# Patient Record
Sex: FEMALE | Employment: FULL TIME | URBAN - METROPOLITAN AREA
[De-identification: names, ages, dates, MRNs, and addresses within clinical notes are randomized per-mention and may not be internally consistent; named-entity substitution may affect disease eponyms.]

---

## 2022-12-03 NOTE — PROGRESS NOTES
[address] [place] [address] 148[facility] [address]  Dept: [phone]    Patient Name: Jayla Parks  Patient Age: 23 y. o. Date of Visit: 2022    SUBJECTIVE:    Chief Complaint:  Missed menses      HPI:    Sonu Hallman  is being seen today for missed menses. She declines concerns and reports she is feeling well. She reports a positive pregnancy test on 22. This  is a planned pregnancy. She is accepting at this time. LMP: Patient's last menstrual period was 10/30/2022 (exact date). Sure and definite: Yes    28 day cycle: Yes    She was not on a contraceptive at the time of conception. Estimated weeks gestation today : 5w4d  Tentative EVITA: 23    Relationship with FOB: Abiel     Patient reports concerns: no    OB History          0    Para   0    Term   0       0    AB   0    Living   0         SAB   0    IAB   0    Ectopic   0    Molar   0    Multiple   0    Live Births   0              History reviewed. No pertinent past medical history. Current Outpatient Medications   Medication Sig Dispense Refill    Prenat w/o P-QI-Bxxbnkp-FA-DHA (PNV-DHA PO) Take by mouth      Misc. Devices KIT Nature's Blend Prenatal Multivitamin with Minerals [facility] Baby Box)  Ul[address]: [address]-41; Take 1 softgel by mouth daily or as instructed by provider;  #qs for 6 months 1 kit 1     No current facility-administered medications for this visit.          OBJECTIVE:    /60 (Position: Sitting)   Ht 5' 4\" (1.626 m)   Wt 123 lb 3.2 oz (55.9 kg)   LMP 10/30/2022 (Exact Date)   BMI 21.15 kg/m²     Mother's ethnicity:  cauc[place]   Father's ethnicity:  c[place]     She is complaining today of:   Pain: No  Cramping: No  Bleeding or spotting: No    Nausea: No  Vomiting: No    Breast enlargement/tenderness: No  Fatigue: No  Frequency of urination: No    Previous high risk obstetrical history: NA  OB History    Para Term  AB Living   0 0 0 0 0 0   SAB IAB Ectopic Molar Multiple Live Births   0 0 0 0 0 0       History was reviewed as documented on Epic Navigator. ASSESSMENT/PLAN:  Missed Menses  - UCG was done and noted as positive  - Patient is taking prenatal vitamins - Yes  - Ultrasound for dating and viability was ordered: Yes  - OB form was given: No  - Initial information on genetic testing was given:Yes   Hailey Hurley offered and unsure of genetic screening at this time. Reviewed routine tests done at new OB visit and patient verbally consents to urine drug screen and HIV screening at New OB Visit  - 1 hour glucola needed at OB history: No  - She was instructed to call with any concerns or worsening of any symptoms  - She will return for New OB intake visit  - Education given and reviewed with Hailey Hurley today include How Your Fetus Grows During Pregnancy ACOG FAQ, Routine Tests During Pregnancy ACOG FAQ, Carrier Screening ACOG FAQ, Prenatal Genetic Screening Tests ACOG FAQ, Prenatal Genetic Testing Chart, and Resources for your Pregnancy, birth, labor and beyond  - POCT urine pregnancy  - US OB LESS THAN 14 WEEKS SINGLE OR FIRST GESTATION; Future  - US OB TRANSVAGINAL; Future      Patient was seen with total face to face time of 30 minutes. More than 50% of this visit was spent face to face coordinating plan of care and answering questions regarding      Return in about 2 weeks (around 12/22/2022) for dating and viability US and IPV.     Electronically signed by: PILAR Torres CNM

## 2022-12-07 NOTE — TELEPHONE ENCOUNTER
Dr. Yelena Auguste, patient interested in free prenatal vitamins and iron per response from [address] [facility]. Order for [facility] Baby Box pending for your convenience. Thank you,  Vaibhav Mendieta, PharmSTEFANIE  [address]  Department, toll free: [phone]  [address]      =======================================================================    [place] REVIEW - Be Well With Baby    JIMBO Cope is a 23 y.o. female currently identified as interested in receiving a [facility] \"Baby Box\" containing a 1 year supply of prenatal vitamins from [facility]. Contents of Baby Box:  Welcome Letter  6 month supply of Nature's Blend Prenatal Multivitamin with Minerals (Take 1 softgel once daily) with 1 refill    Thank you  Michelle Mendieta PharmD  [address]  Department, toll free: [phone]  [address]

## 2022-12-08 ENCOUNTER — OFFICE VISIT (OUTPATIENT)
Dept: OBGYN CLINIC | Age: 22
End: 2022-12-08

## 2022-12-08 VITALS
HEIGHT: 64 IN | WEIGHT: 123.2 LBS | SYSTOLIC BLOOD PRESSURE: 102 MMHG | BODY MASS INDEX: 21.03 KG/M2 | DIASTOLIC BLOOD PRESSURE: 60 MMHG

## 2022-12-08 DIAGNOSIS — N92.6 MISSED MENSES: Primary | ICD-10-CM

## 2022-12-08 LAB
CONTROL: PRESENT
PREGNANCY TEST URINE, POC: POSITIVE

## 2022-12-08 SDOH — ECONOMIC STABILITY: FOOD INSECURITY: WITHIN THE PAST 12 MONTHS, THE FOOD YOU BOUGHT JUST DIDN'T LAST AND YOU DIDN'T HAVE MONEY TO GET MORE.: NEVER TRUE

## 2022-12-08 SDOH — ECONOMIC STABILITY: HOUSING INSECURITY: IN THE LAST 12 MONTHS, HOW MANY PLACES HAVE YOU LIVED?: 1

## 2022-12-08 SDOH — ECONOMIC STABILITY: INCOME INSECURITY: IN THE LAST 12 MONTHS, WAS THERE A TIME WHEN YOU WERE NOT ABLE TO PAY THE MORTGAGE OR RENT ON TIME?: NO

## 2022-12-08 SDOH — ECONOMIC STABILITY: FOOD INSECURITY: WITHIN THE PAST 12 MONTHS, YOU WORRIED THAT YOUR FOOD WOULD RUN OUT BEFORE YOU GOT MONEY TO BUY MORE.: NEVER TRUE

## 2022-12-08 SDOH — ECONOMIC STABILITY: TRANSPORTATION INSECURITY
IN THE PAST 12 MONTHS, HAS LACK OF TRANSPORTATION KEPT YOU FROM MEETINGS, WORK, OR FROM GETTING THINGS NEEDED FOR DAILY LIVING?: NO

## 2022-12-08 SDOH — ECONOMIC STABILITY: TRANSPORTATION INSECURITY
IN THE PAST 12 MONTHS, HAS THE LACK OF TRANSPORTATION KEPT YOU FROM MEDICAL APPOINTMENTS OR FROM GETTING MEDICATIONS?: NO

## 2022-12-08 SDOH — ECONOMIC STABILITY: HOUSING INSECURITY
IN THE LAST 12 MONTHS, WAS THERE A TIME WHEN YOU DID NOT HAVE A STEADY PLACE TO SLEEP OR SLEPT IN A SHELTER (INCLUDING NOW)?: NO

## 2022-12-08 ASSESSMENT — PATIENT HEALTH QUESTIONNAIRE - PHQ9
SUM OF ALL RESPONSES TO PHQ QUESTIONS 1-9: 0
SUM OF ALL RESPONSES TO PHQ QUESTIONS 1-9: 0
1. LITTLE INTEREST OR PLEASURE IN DOING THINGS: 0
2. FEELING DOWN, DEPRESSED OR HOPELESS: 0
SUM OF ALL RESPONSES TO PHQ9 QUESTIONS 1 & 2: 0
SUM OF ALL RESPONSES TO PHQ QUESTIONS 1-9: 0
SUM OF ALL RESPONSES TO PHQ QUESTIONS 1-9: 0

## 2022-12-08 ASSESSMENT — SOCIAL DETERMINANTS OF HEALTH (SDOH)
WITHIN THE LAST YEAR, HAVE YOU BEEN AFRAID OF YOUR PARTNER OR EX-PARTNER?: NO
WITHIN THE LAST YEAR, HAVE TO BEEN RAPED OR FORCED TO HAVE ANY KIND OF SEXUAL ACTIVITY BY YOUR PARTNER OR EX-PARTNER?: NO
WITHIN THE LAST YEAR, HAVE YOU BEEN HUMILIATED OR EMOTIONALLY ABUSED IN OTHER WAYS BY YOUR PARTNER OR EX-PARTNER?: NO
HOW HARD IS IT FOR YOU TO PAY FOR THE VERY BASICS LIKE FOOD, HOUSING, MEDICAL CARE, AND HEATING?: NOT HARD AT ALL
WITHIN THE LAST YEAR, HAVE YOU BEEN KICKED, HIT, SLAPPED, OR OTHERWISE PHYSICALLY HURT BY YOUR PARTNER OR EX-PARTNER?: NO

## 2022-12-28 PROBLEM — N30.01 ACUTE CYSTITIS WITH HEMATURIA: Status: ACTIVE | Noted: 2018-02-14

## 2022-12-29 ENCOUNTER — HOSPITAL ENCOUNTER (OUTPATIENT)
Age: 22
Setting detail: SPECIMEN
Discharge: HOME | End: 2022-12-29

## 2022-12-29 ENCOUNTER — INITIAL PRENATAL (OUTPATIENT)
Dept: OBGYN CLINIC | Age: 22
End: 2022-12-29

## 2022-12-29 VITALS
WEIGHT: 121.9 LBS | BODY MASS INDEX: 20.92 KG/M2 | SYSTOLIC BLOOD PRESSURE: 102 MMHG | DIASTOLIC BLOOD PRESSURE: 60 MMHG | HEART RATE: 61 BPM

## 2022-12-29 DIAGNOSIS — Z34.00 NORMAL FIRST PREGNANCY CONFIRMED, ANTEPARTUM: ICD-10-CM

## 2022-12-29 PROCEDURE — 0500F INITIAL PRENATAL CARE VISIT: CPT | Performed by: ADVANCED PRACTICE MIDWIFE

## 2022-12-29 SDOH — ECONOMIC STABILITY: FOOD INSECURITY: WITHIN THE PAST 12 MONTHS, THE FOOD YOU BOUGHT JUST DIDN'T LAST AND YOU DIDN'T HAVE MONEY TO GET MORE.: NEVER TRUE

## 2022-12-29 SDOH — ECONOMIC STABILITY: FOOD INSECURITY: WITHIN THE PAST 12 MONTHS, YOU WORRIED THAT YOUR FOOD WOULD RUN OUT BEFORE YOU GOT MONEY TO BUY MORE.: NEVER TRUE

## 2022-12-29 ASSESSMENT — ANXIETY QUESTIONNAIRES
7. FEELING AFRAID AS IF SOMETHING AWFUL MIGHT HAPPEN: 0
GAD7 TOTAL SCORE: 1
IF YOU CHECKED OFF ANY PROBLEMS ON THIS QUESTIONNAIRE, HOW DIFFICULT HAVE THESE PROBLEMS MADE IT FOR YOU TO DO YOUR WORK, TAKE CARE OF THINGS AT HOME, OR GET ALONG WITH OTHER PEOPLE: NOT DIFFICULT AT ALL
5. BEING SO RESTLESS THAT IT IS HARD TO SIT STILL: 0
2. NOT BEING ABLE TO STOP OR CONTROL WORRYING: 0
6. BECOMING EASILY ANNOYED OR IRRITABLE: 0
1. FEELING NERVOUS, ANXIOUS, OR ON EDGE: 0
3. WORRYING TOO MUCH ABOUT DIFFERENT THINGS: 0
4. TROUBLE RELAXING: 1

## 2022-12-29 ASSESSMENT — SOCIAL DETERMINANTS OF HEALTH (SDOH)
WITHIN THE LAST YEAR, HAVE YOU BEEN HUMILIATED OR EMOTIONALLY ABUSED IN OTHER WAYS BY YOUR PARTNER OR EX-PARTNER?: NO
WITHIN THE LAST YEAR, HAVE YOU BEEN AFRAID OF YOUR PARTNER OR EX-PARTNER?: NO
WITHIN THE LAST YEAR, HAVE YOU BEEN KICKED, HIT, SLAPPED, OR OTHERWISE PHYSICALLY HURT BY YOUR PARTNER OR EX-PARTNER?: NO
WITHIN THE LAST YEAR, HAVE TO BEEN RAPED OR FORCED TO HAVE ANY KIND OF SEXUAL ACTIVITY BY YOUR PARTNER OR EX-PARTNER?: NO
HOW HARD IS IT FOR YOU TO PAY FOR THE VERY BASICS LIKE FOOD, HOUSING, MEDICAL CARE, AND HEATING?: NOT HARD AT ALL

## 2022-12-29 NOTE — PROGRESS NOTES
New Obstetric Intake     Patient Name:Malaika Ca  Patient Age: 23 y. o. Date of Visit: 2022  Patient's estimated gestational age at visit: 8w3d  Estimated Date of Delivery: 23     SUBJECTIVE:     Any Concerns Today: no- patient and  were unsure about genetic testing and are possibly interested in [facility] screening but want to contact drug rep Bandar Alvarenga first to find out about cost. Patient will contact office if they decide to have it done. OB History          1    Para   0    Term   0       0    AB   0    Living   0         SAB   0    IAB   0    Ectopic   0    Molar   0    Multiple   0    Live Births   0                [place] Score:   Postpartum Depression: Low Risk     Last EPDS Total Score: 1    Last EPDS Self Harm Result: Never      History of postpartum depression: N/A    Generalized Anxiety Screening:  URSULA 7 SCORE 2022   URSULA-7 Total Score 1     Interpretation of URSULA-7 score: 5-9 = mild anxiety, 10-14 = moderate anxiety, 15+ = severe anxiety. Recommend referral to behavioral health for scores 10 or greater. Social Determinants of Health:   Financial Resource Strain: Low Risk     Difficulty of Paying Living Expenses: Not hard at all      Food Insecurity: No Food Insecurity    Worried About [address] [employer] in the Last Year: Never true    Ran Out of Food in the Last Year: Never true      Transportation Needs: No Transportation Needs    Lack of Transportation (Medical): No    Lack of Transportation (Non-Medical): No      Intimate Partner Violence: Not At Risk    Fear of Current or Ex-Partner: No    Emotionally Abused: No    Physically Abused: No    Sexually Abused: No       OBJECTIVE:       /60   Pulse 61   Wt 121 lb 14.4 oz (55.3 kg)   LMP 10/30/2022 (Exact Date)      Medications:  Current Outpatient Medications   Medication Sig Dispense Refill    Misc.  Devices KIT Nature's Blend Prenatal Multivitamin with Minerals [facility] Baby Box)  NDC: [address]-03; Take 1 softgel by mouth daily or as instructed by provider;  #qs for 6 months 1 kit 1    Prenat w/o G-TA-Dasrmqu-FA-DHA (PNV-DHA PO) Take by mouth       No current facility-administered medications for this visit. Information about this pregnancy:   Planned Pregnancy: Yes, Accepting: Yes  Father of Baby: Fidel sinclair and is involved with the pregnancy  Patient's last menstrual period was 10/30/2022 (exact date). 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 V's, Environmental/Work Hazards: No  Smoker: No  History of Substance Abuse: no  History of Sexual Abuse: no  Current of past history of intimate partner violence: no  Pets at home: yes - 2 dogs  See Epic Navigator for full Infection Genetic Risk Screening. Positive screening results from Navigator: none    Infection History:   Prior GBS-infected child: N/A  Any recent travel within the last 3 months out of the country: no, Partner: no  See Epic Navigator for full Infection Risk Screening. Positive screening results from Navigator: none    Previous Birth History: N/A    High Risk Factor Screening for this Pregnancy:  Age greater than 28 at delivery: No  History of  delivery: N/A  History of bleeding disorders: No  Objection to receiving blood products: No  History of diabetes (gestational or outside of pregnancy): No, On medications: NA  Screening for pregestational diabetes:   BMI greater than 30: No. If Yes, need early glucola  BMI greater than 25 ( Americans greater than 23): No If Yes, need 1 more risk factor.    Physical inactivity: No  First-degree relative with diabetes: No  High-risk race of ethnicity (eg, [place] American,  [employer], Native [address] [place] [address], [address] [facility]): No  Previously given birth to an infant weighing 4vet12ti (4000g) or more: NA  History of hypertension: No  HDL < 35mg/dL and/or a triglyceride level greater than 250 mg/dL: No- 5/26/22  History of polycystic ovarian syndrome: No  A1c greater than or equal to 5.7%: No      ASSESSMENT/EDUCATION:     The following were addressed during this visit:    Trimester 1  - Blood Product Preferences   - Depression Screening   - Environmental/Occupational Hazards   - Genetic Discussion   - Immunizations/Disease Testing   - Medications   - Prenatal Care Expectations   - When to call your Doctor in Pregnancy     Education Trimester 1  - Infectious Disease Education   - Nutrition/Weight Gain   - Travel   - Lifestyle   - Prenatal Care and Practice Information   - Diet and Weight Gain   - Domestic Abuse Screen   - Breastfeeding Education: First Trimester   - Discuss Share Everywhere and MyChart with Patient   - Travel   - Infectious Disease Education   - Nutrition/Weight Gain        Pregnancy at 8w4d   She was counseled on office policies. She was educated about the anticipated course of prenatal care and routine prenatal labs. Patient has consented to HIV testing and urine drug screen: Yes  Initial Pathways Screen was completed: No-patient declined  Pregnancy Education Checklist initiated and documented above. The patient was counseled on carrier screening by CNM at prior visit. She declined CF/SMA/Fragile X testing. See scanned form. The patient has been counseled on the option for 1st trimester screen vs NIPs by CNM at prior visit. She verbalized understanding and would like to proceed with: neither. See scanned form. The patient was informed that [facility] only delivers at [address] and is agreeable to delivery at [address]. She denies tobacco use.  The patient was counseled on the risks of tobacco abuse, both maternal and fetal. She was instructed to stop smoking if currently using tobacco. Morbidity, mortality, and cessation programs were reviewed. The risks include but are not limited to increased risks of  labor,  delivery, premature rupture of membranes, intrauterine growth restriction, intrauterine fetal demise and abruptio placenta. Secondary smoke risks were also reviewed. Increases in cancer, respiratory problems, and sudden infant death syndrome were reviewed as well. She was made aware of the Midwife Philosophy against Elective Induction. Susanne Queen was seen today for initial prenatal visit. Diagnoses and all orders for this visit:    Normal first pregnancy confirmed, antepartum  -     C.trachomatis N.gonorrhoeae DNA, Urine; Future  -     Culture, Urine; Future  -     Hepatitis C Antibody; Future  -     HIV Screen; Future  -     Varicella Zoster Antibody, IgG; Future  -     Urine Drug Screen; Future  -     Prenatal type and screen; Future  -     Prenatal Profile I; Future  -     Ambulatory referral to Maternal Fetal       Completed chart forwarded to [facility] after completing visit.      Charted by Skyler Thompson LPN

## 2022-12-30 DIAGNOSIS — Z34.00 NORMAL FIRST PREGNANCY CONFIRMED, ANTEPARTUM: ICD-10-CM

## 2022-12-30 LAB
ABO/RH: NORMAL
ABSOLUTE EOS #: <0.03 K/UL (ref 0–0.44)
ABSOLUTE IMMATURE GRANULOCYTE: <0.03 K/UL (ref 0–0.3)
ABSOLUTE LYMPH #: 1.75 K/UL (ref 1.1–3.7)
ABSOLUTE MONO #: 0.48 K/UL (ref 0.1–1.2)
AMPHETAMINE SCREEN URINE: NEGATIVE
ANTIBODY SCREEN: NEGATIVE
BARBITURATE SCREEN URINE: NEGATIVE
BASOPHILS # BLD: 0 % (ref 0–2)
BASOPHILS ABSOLUTE: 0.03 K/UL (ref 0–0.2)
BENZODIAZEPINE SCREEN, URINE: NEGATIVE
C. TRACHOMATIS DNA ,URINE: NEGATIVE
CANNABINOID SCREEN URINE: NEGATIVE
COCAINE METABOLITE, URINE: NEGATIVE
CULTURE: NO GROWTH
EOSINOPHILS RELATIVE PERCENT: 0 % (ref 1–4)
FENTANYL URINE: NEGATIVE
HCT VFR BLD CALC: 39.7 % (ref 36.3–47.1)
HEMOGLOBIN: 13.1 G/DL (ref 11.9–15.1)
HEPATITIS B SURFACE ANTIGEN: NONREACTIVE
HEPATITIS C ANTIBODY: NONREACTIVE
HIV AG/AB: NONREACTIVE
IMMATURE GRANULOCYTES: 0 %
LYMPHOCYTES # BLD: 24 % (ref 24–43)
MCH RBC QN AUTO: 32.9 PG (ref 25.2–33.5)
MCHC RBC AUTO-ENTMCNC: 33 G/DL (ref 28.4–34.8)
MCV RBC AUTO: 99.7 FL (ref 82.6–102.9)
METHADONE SCREEN, URINE: NEGATIVE
MONOCYTES # BLD: 6 % (ref 3–12)
N. GONORRHOEAE DNA, URINE: NEGATIVE
NRBC AUTOMATED: 0 PER 100 WBC
OPIATES, URINE: NEGATIVE
OXYCODONE SCREEN URINE: NEGATIVE
PDW BLD-RTO: 13.2 % (ref 11.8–14.4)
PHENCYCLIDINE, URINE: NEGATIVE
PLATELET # BLD: 208 K/UL (ref 138–453)
PMV BLD AUTO: 11.1 FL (ref 8.1–13.5)
RBC # BLD: 3.98 M/UL (ref 3.95–5.11)
RUBV IGG SER QL: 229.1 IU/ML
SEG NEUTROPHILS: 70 % (ref 36–65)
SEGMENTED NEUTROPHILS ABSOLUTE COUNT: 5.16 K/UL (ref 1.5–8.1)
SPECIMEN DESCRIPTION: NORMAL
SPECIMEN DESCRIPTION: NORMAL
T. PALLIDUM, IGG: NONREACTIVE
TEST INFORMATION: NORMAL
VZV IGG SER QL IA: 1.96
WBC # BLD: 7.5 K/UL (ref 3.5–11.3)

## 2023-01-09 ENCOUNTER — TELEPHONE (OUTPATIENT)
Dept: OBGYN CLINIC | Age: 23
End: 2023-01-09

## 2023-01-09 NOTE — TELEPHONE ENCOUNTER
Patient called and stated that they changed their mind and would to have cfdna/carrier screening done and wants to do with her next OB appt on 1/23/23 and note place in appt notes.

## 2023-01-23 ENCOUNTER — ROUTINE PRENATAL (OUTPATIENT)
Dept: OBGYN CLINIC | Age: 23
End: 2023-01-23

## 2023-01-23 ENCOUNTER — HOSPITAL ENCOUNTER (OUTPATIENT)
Age: 23
Setting detail: SPECIMEN
Discharge: HOME | End: 2023-01-23

## 2023-01-23 VITALS — BODY MASS INDEX: 21.11 KG/M2 | DIASTOLIC BLOOD PRESSURE: 60 MMHG | WEIGHT: 123 LBS | SYSTOLIC BLOOD PRESSURE: 106 MMHG

## 2023-01-23 DIAGNOSIS — Z3A.12 12 WEEKS GESTATION OF PREGNANCY: ICD-10-CM

## 2023-01-23 DIAGNOSIS — Z34.90 NORMAL INTRAUTERINE PREGNANCY, ANTEPARTUM: Primary | ICD-10-CM

## 2023-01-23 DIAGNOSIS — Z34.90 NORMAL INTRAUTERINE PREGNANCY, ANTEPARTUM: ICD-10-CM

## 2023-01-23 PROBLEM — Z34.00 NORMAL FIRST PREGNANCY CONFIRMED, ANTEPARTUM: Status: RESOLVED | Noted: 2022-12-29 | Resolved: 2023-01-23

## 2023-01-23 PROCEDURE — 0502F SUBSEQUENT PRENATAL CARE: CPT | Performed by: ADVANCED PRACTICE MIDWIFE

## 2023-01-23 NOTE — PROGRESS NOTES
Pt is here today at her 12w1 prenatal visit with genetic screening  Pt states fetal movement is absent  Pt has no concerns

## 2023-01-23 NOTE — PROGRESS NOTES
[facility] AND GYNECOLOGY  [facility] [address] [address] [place] [address]  Dept: [phone]    New Obstetric Intake     Subjective:    Patient Erendira Rosario  Patient Age: 23 y. o. Date of Visit: 2023  Patient's estimated gestational age at visit: 12w0d      Any Concerns Today: no  Patient reports no complaints. She denies spotting, cramping or pain. OB History          1    Para   0    Term   0       0    AB   0    Living   0         SAB   0    IAB   0    Ectopic   0    Molar   0    Multiple   0    Live Births   0                  See Epic Navigator for further genetic and risk screening. Objective:  /60   Wt 123 lb (55.8 kg)   LMP 10/30/2022 (Exact Date)   BMI 21.11 kg/m²   Body mass index is 21.11 kg/m². Physical Exam  Vitals reviewed. Constitutional:       General: She is not in acute distress. Appearance: She is well-developed. She is not diaphoretic. Neck:      Thyroid: No thyromegaly or thyroid tenderness. Cardiovascular:      Rate and Rhythm: Normal rate and regular rhythm. Heart sounds: Normal heart sounds. Pulmonary:      Effort: Pulmonary effort is normal. No respiratory distress. Breath sounds: Normal breath sounds. Abdominal:      General: There is no distension. Palpations: Abdomen is soft. Tenderness: There is no abdominal tenderness. Genitourinary:     Comments: deferred  Musculoskeletal:         General: Normal range of motion. Cervical back: Normal range of motion. Skin:     General: Skin is warm and dry. Neurological:      Mental Status: She is alert and oriented to person, place, and time. Mental status is at baseline. Psychiatric:         Behavior: Behavior normal.         Thought Content:  Thought content normal.         Judgment: Judgment normal.       Chaperone for Intimate Exam  Chaperone was offered as part of the rooming process. Patient declined and agrees to continue with exam without a chaperone. Chaperone: n/a    Mother's Ethnicity:   Father's Ethnicity:       Assessment/Plan:  Pregnancy at 12w0d   Role of ultrasound in pregnancy discussed; fetal survey: ordered  Due date has been established and is based off of LMp  She was counseled on office policies. She was educated about the anticipated course of prenatal care. Warning signs were reviewed. The patient was counseled on drug screening, HIV, Cystic Fibrosis, SMA and Sickle Cell disease, as appropriate. She verbally consented to HIV testing and drug screening. She was counseled on Toxoplasmosis/Listeriosis (cats/raw meat). She denies tobacco use. The patient was counseled on the risks of tobacco abuse, both maternal and fetal. She was instructed to stop smoking if currently using tobacco. Morbidity, mortality, and cessation programs were reviewed. The risks include but are not limited to increased risks of  labor,  delivery, premature rupture of membranes, intrauterine growth restriction, intrauterine fetal demise and abruptio placenta. Secondary smoke risks were also reviewed. Increases in cancer, respiratory problems, and sudden infant death syndrome were reviewed as well. The patient was informed of a 2-4% risk of congenital anomalies in the general population. She was questioned in detail regarding any genetic misnomer history, chromosomal abnormalities, or learning disabilities in herself, the father of the baby or their families. She denies any history as stated above. Discussed in detail referral for genetic screening through [facility].  Discussed in detail referral/orders for  for 1st trimester screen vs NIPSinfo provided for screening  Discussed with patient that if they proceed with the NIPs testing then they will be opting out of 1st trimester screening which can provide information in regards to placental health, congenital heart defects, metabolic abnormalities, and anatomic abnormalities. She verbalizes understanding and would like to proceed with: NIPT  Route of delivery and counseling on vaginal, operative vaginal, and  sections were discussed. Further counseling will be handled by the provider at subsequent visits. Encouraged well-balanced diet, plenty of rest when needed, prenatal vitamins daily and walking for exercise. Discussed self-help for nausea, avoiding OTC medications until consulting provider or pharmacist, other than Tylenol PRN, minimal caffeine (1-2 cups daily) and avoiding alcohol. Discussed exercise safety in pregnancy. 1. Normal intrauterine pregnancy, antepartum  - Panorama Prenatal Test: Chromosomes 13, 18, 21, X & Y: Triploidy 22Q.11.2 Deletion; Future  - Horizon 14 (Pan-Ethnic Standard); Future    2. 12 weeks gestation of pregnancy  - Pap smear up to date  - Reviewed warning signs  - NIPT/carrier screening drawn after visit  - Anatomy scan scheduled     Upon completion of the visit all questions were answered. ROS was done and is negative except as documented in HPI. History was reviewed as documented on Epic Navigator. The patient, Mary Parrish, was seen with a total time spent of 25 minutes for the visit on this date of service by the [facility]  The time component involved both face-to-face (counseling and education) and non face-to-face time (care coordination), spent in determining the total time component. Return in about 4 weeks (around 2023) for Return OB.     Electronically Signed by: Verner Gable, [address]

## 2023-01-27 ENCOUNTER — CARE COORDINATION (OUTPATIENT)
Dept: OTHER | Facility: CLINIC | Age: 23
End: 2023-01-27

## 2023-01-27 NOTE — CARE COORDINATION
ACM attempted to reach patient for introduction to Associate Care Management related to Maternity CM. HIPAA compliant message left requesting a return phone call. Will attempt to outreach patient again. RUSLAN Romero, RN  Associate Care Manager   Cell: [phone]  [email or web address]

## 2023-02-01 LAB
Lab: NEGATIVE
Lab: NORMAL
NTRA ALPHA-THALASSEMIA: NEGATIVE
NTRA BETA-HEMOGLOBINOPATHIES: NEGATIVE
NTRA CANAVAN DISEASE: NEGATIVE
NTRA CYSTIC FIBROSIS: NEGATIVE
NTRA DUCHENNE/BECKER MUSCULAR DYSTROPHY: NEGATIVE
NTRA FAMILIAL DYSAUTONOMIA: NEGATIVE
NTRA FRAGILE X SYNDROME: NEGATIVE
NTRA GALACTOSEMIA: NEGATIVE
NTRA GAUCHER DISEASE: NEGATIVE
NTRA MEDIUM CHAIN ACYL-COA DEHYDROGENASE DEFICIENCY: NEGATIVE
NTRA POLYCYSTIC KIDNEY DISEASE, AUTOSOMAL RECESSIVE: NEGATIVE
NTRA SMITH-LEMLI-OPITZ SYNDROME: NEGATIVE
NTRA SPINAL MUSCULAR ATROPHY: NEGATIVE
NTRA TAY-SACHS DISEASE: NEGATIVE

## 2023-02-18 NOTE — PROGRESS NOTES
SUBJECTIVE:  Kailee Alvarado is here for her return OB visit. She is doing well and offers no complaints today  She denies fetal movement. She denies vaginal bleeding. She denies vaginal discharge. She denies leaking of fluid. She denies uterine contraction activity. She denies nausea and/or vomiting. She denies retaining fluid in her extremities. OBJECTIVE:  Blood pressure (!) 102/58, pulse 92, weight 123 lb (55.8 kg), last menstrual period 10/30/2022. ASSESSMENT/PLAN:  1. 16 weeks gestation of pregnancy  S=D    2. 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   Normal: BMI < 25: Gain 25-35 lb    Kailee Alvarado was counseled regarding all of the above    The patient, Alexey Castle,  was seen with a total time spent of 20 minutes for the visit on this date of service by the [facility]  On this date February 20, 2023,  I have spent greater than 50% of this visit:  [x] reviewing previous notes  [x] reviewing test results  [] pre-charting  Discussed with patient:  [] Importance of compliance with treatment plan  [x] Counseling  [x] Coordinating care  [x] Documenting

## 2023-02-20 ENCOUNTER — ROUTINE PRENATAL (OUTPATIENT)
Dept: OBGYN CLINIC | Age: 23
End: 2023-02-20

## 2023-02-20 VITALS
WEIGHT: 123 LBS | HEART RATE: 92 BPM | BODY MASS INDEX: 21.11 KG/M2 | DIASTOLIC BLOOD PRESSURE: 58 MMHG | SYSTOLIC BLOOD PRESSURE: 102 MMHG

## 2023-02-20 DIAGNOSIS — Z34.90 NORMAL INTRAUTERINE PREGNANCY, ANTEPARTUM: Primary | ICD-10-CM

## 2023-02-20 DIAGNOSIS — Z3A.16 16 WEEKS GESTATION OF PREGNANCY: ICD-10-CM

## 2023-02-20 PROCEDURE — 0502F SUBSEQUENT PRENATAL CARE: CPT | Performed by: ADVANCED PRACTICE MIDWIFE

## 2023-03-10 ENCOUNTER — CARE COORDINATION (OUTPATIENT)
Dept: OTHER | Facility: CLINIC | Age: 23
End: 2023-03-10

## 2023-03-10 NOTE — CARE COORDINATION
Patient eligible for [facility] Manager contacted the patient by telephone to discuss the maternity management program.  Patient agrees to care management services at this time. Verified name and  with patient as identifiers. Patient Current Location: [facility]      Call within 2 business days of discharge: Yes     Last Discharge [address]       None            Risk Factors Identified:  None      Needs to be reviewed by the provider   none         Method of communication with provider : none    Advance Care Planning:   Does patient have an Advance Directive:  reviewed and current. Does patient have OB/Gyn Selected? Yes    Discussed follow up appointments. If no appointment was previously scheduled, appointment scheduling offered: Yes  [address] follow up appointment(s):   Future Appointments   Date Time Provider Walter Ocampo   3/20/2023  2:10 PM PILAR Hess CNM Sprg Deb OB [facility]   3/24/2023  8:00 AM ULTRASONOGRAPHER 1 Mat Fetal [facility]     Non-[facility] follow up appointment(s): n/a    OB History:   OB History    Para Term  AB Living   1 0 0 0 0 0   SAB IAB Ectopic Molar Multiple Live Births   0 0 0 0 0 0      # Outcome Date GA Lbr Rashad/2nd Weight Sex Delivery Anes PTL Lv   1 Current                18w5d    Medication reconciliation was performed with patient, who verbalizes understanding of administration of home medications. Advised obtaining a 90-day supply of all daily and as-needed medications. Barriers/Support system:  patient and spouse  Return to work planning? Not yet      2nd and 3rd Trimester Focused Assessment:   Healthy behavior review  Genetic Screening completed/reviewed-normal  MFM referral needed? No  Fetal movement-every day  Red flags: bleeding/leaking  Labor signs and symptoms-None  Birth planning:  planned  Maternity Classes?  Yes, URL sent      Patient given an opportunity to ask questions and does not have any further questions or concerns at this time. Were discharge instructions available to patient? Yes. Reviewed appropriate site of care based on symptoms and resources available to patient including: PCP  Specialist  Benefits related nurse triage line  Urgent care clinics  When to call [phone]. The patient agrees to contact the OB/Gyn office for questions related to their healthcare. Plan for a follow-up call in 4 weeks  based on severity of symptoms and risk factors. Plan for next call: symptom management-any new s/s?  self management-any new concerns?  follow up appointment-how did 20 week u/s and visit go? Gender? ?     Goals        Attends follow-up appointments as directed      Educate and encourage importance of FU for prevention of complications or disease;  Assess the patient's relationship with a PCP and next FU visit scheduled; Discuss importance of adherence to treatment plan and follow up visits; Identify any barriers in transportation or access to FU appointments. Assist patient with making FU appointments as needed;   It's also a good idea to know your test results. Keep a list of the medicines you take. Patient will identify signs and symptoms requiring evaluation and intervention      Demonstrate how client is to evaluate contraction activity after discharge (e.g., lying down, tilted to the side with a pillow to the back, placing fingertips on the fundus for approximately 1 hour to note hardening or tightening of the uterus). Identify signs and/or symptoms that should be reported immediately to the healthcare provider (e.g, sustained uterine contractions, clear drainage from vagina, bleeding). Provide information about follow-up care when client is discharged. Advise client to empty bladder every two (2) hours while awake  Review daily fluid need; avoid coffee. Encourage regular rest periods 2-3 times a day in side-lying position.  If bedrest is to be continued after discharge, suggest client spend part of day on couch or recliner. Assist patient to identify any risky behaviors and support change;     Red flags maternity  Call 911 anytime you think you may need emergency care. For example, call if:  Call your doctor now or seek immediate medical care if:     You passed out (lost consciousness). You have a seizure. You have severe vaginal bleeding. You have severe pain in your belly or pelvis. You have had fluid gushing or leaking from your vagina and you know or think the  umbilical cord is bulging into your vagina. If this happens, immediately get down on  your knees so your rear end (buttocks) is higher than your head. This will decrease  the pressure on the cord until help arrives. You have signs of preeclampsia, such as:  Sudden swelling of your face, hands, or feet. New vision problems (such as dimness, blurring, or seeing spots). A severe headache. You have any vaginal bleeding. You have belly pain or cramping. You have a fever. You have had regular contractions (with or without pain) for an hour. This means that  you have 8 or more within 1 hour or 4 or more in 20 minutes after you change your  position and drink fluids. You have a sudden release of fluid from your vagina. You have low back pain or pelvic pressure that does not go away. You notice that your baby has stopped moving or is moving much less than normal.            Provided Education: related to monitoring for s/s of labor, s/s of antepartum anxiety and/or depression, red-flag s/s and precautions, importance of taking all medications as prescribed and importance of prenatal follow-up visits. Plan: Continue monthly outreaches to provide telephonic support, education and resources as needed. Discuss / follow up on: Goal progress and follow-up appointment scheduling  Summary Note: Patient reports that she is doing well.  She denies any red-flag s/s or needs r/t DME, medications or appointments. She is now 18.5 weeks pregnant, due on 8/6/23. Patient's pregnancy is not complicated by anything. Patient states that she does not have new concerns or changes in her medications or history. She did participate in [facility] and has not completed the program at this time. She denies any needs at this time and is appreciative of the follow-up call. She is agreeable to a follow-up call with ACM in 4 weeks and will call with any needs in the meantime. RUSLAN Ernst, RN  Associate Care Manager   Cell: [phone]  [email or web address]

## 2023-03-20 ENCOUNTER — ROUTINE PRENATAL (OUTPATIENT)
Dept: OBGYN CLINIC | Age: 23
End: 2023-03-20

## 2023-03-20 VITALS
WEIGHT: 130 LBS | SYSTOLIC BLOOD PRESSURE: 102 MMHG | DIASTOLIC BLOOD PRESSURE: 60 MMHG | BODY MASS INDEX: 22.31 KG/M2 | HEART RATE: 78 BPM

## 2023-03-20 DIAGNOSIS — Z3A.20 20 WEEKS GESTATION OF PREGNANCY: Primary | ICD-10-CM

## 2023-03-20 DIAGNOSIS — Z34.00 NORMAL FIRST PREGNANCY CONFIRMED, ANTEPARTUM: ICD-10-CM

## 2023-03-20 PROCEDURE — 0502F SUBSEQUENT PRENATAL CARE: CPT | Performed by: ADVANCED PRACTICE MIDWIFE

## 2023-03-20 NOTE — PROGRESS NOTES
SUBJECTIVE:  [facility] is here for her return OB visit. She reports fetal movement. She denies vaginal bleeding. She denies vaginal discharge. She denies leaking of fluid. She denies uterine contraction activity. She denies nausea and/or vomiting. She denies retaining fluid in her extremities. No concerns today. OBJECTIVE:  Last menstrual period 10/30/2022. ASSESSMENT/PLAN:    20 weeks gestation of pregnancy  - NIPT low risk  - Negative carrier screen  - Anatomy scan 03/24/2023      The problem list was reviewed and updated as needed with any new issues today    [facility] will begin to monitor fetal movement daily    Quad screen and or single marker AFP results were not discussed. - Declined  Labs were not ordered. BMI and appropriate weight gain recommendations were discussed. Normal: BMI < 25: Gain 25-35 lb  Overweight: BMI 25-30: Gain 15-25 lb  Obese: BMI > 30: Gain 11-20 lb    [facility] was counseled regarding all of the above      Patient was seen with total face to face time of 15 minutes. More than 50% of this  visit was for counseling and education.     Jayda ABDI

## 2023-03-20 NOTE — PROGRESS NOTES
Pt is here today at her 20w1 prenatal visit  Pt states fetal movement is present  Pt has no concerns

## 2023-03-24 ENCOUNTER — ROUTINE PRENATAL (OUTPATIENT)
Dept: PERINATAL CARE | Age: 23
End: 2023-03-24

## 2023-03-24 VITALS
HEART RATE: 80 BPM | RESPIRATION RATE: 16 BRPM | HEIGHT: 64 IN | SYSTOLIC BLOOD PRESSURE: 122 MMHG | TEMPERATURE: 97.8 F | DIASTOLIC BLOOD PRESSURE: 64 MMHG | WEIGHT: 129.19 LBS | BODY MASS INDEX: 22.06 KG/M2

## 2023-03-24 DIAGNOSIS — Z3A.20 20 WEEKS GESTATION OF PREGNANCY: ICD-10-CM

## 2023-03-24 DIAGNOSIS — Z36.86 ENCOUNTER FOR SCREENING FOR RISK OF PRE-TERM LABOR: ICD-10-CM

## 2023-03-24 DIAGNOSIS — Z13.89 ENCOUNTER FOR ROUTINE SCREENING FOR MALFORMATION USING ULTRASONICS: Primary | ICD-10-CM

## 2023-03-24 NOTE — PROGRESS NOTES
Obstetric/Gynecology Maternal Fetal Medicine Resident Note    Patient has previously completedmaternal genetic carrier screening and noninvasive prenatal testing . Patient declined AFP testing today.       DO PAUL Roper Resident, PGY1  [facility]  3/24/2023, 8:47 AM

## 2023-04-07 ENCOUNTER — CARE COORDINATION (OUTPATIENT)
Dept: OTHER | Facility: CLINIC | Age: 23
End: 2023-04-07

## 2023-04-07 NOTE — CARE COORDINATION
Ambulatory Care Coordination Note    ACM attempted to reach patient for Associate Care Management related to Maternity CM follow-up call. HIPAA compliant message left requesting a return phone call at patient convenience. Plan for follow-up call in 10-14 days      Future Appointments   Date Time Provider Walter Ocampo   4/21/2023  8:10 AM PILAR Webb CNM Sprg Deb OB RUSLAN Sims, RN  Associate Care Manager   Cell: [phone]  [email or web address]

## 2023-04-20 NOTE — PROGRESS NOTES
SUBJECTIVE:  Hazel Tate is here for her return OB visit. She is doing well today and offers no complaints  Will do Glucola NOV  She reports fetal movement. She denies vaginal bleeding. She denies vaginal discharge. She denies leaking of fluid. She denies uterine contraction activity. She denies nausea and/or vomiting. She denies retaining fluid in her extremities. OBJECTIVE:  Blood pressure (!) 106/54, pulse 84, weight 133 lb (60.3 kg), last menstrual period 10/30/2022. Hazel Tate has not the Tdap vaccine as appropriate      Postpartum Depression: Low Risk     Last EPDS Total Score: 1    Last EPDS Self Harm Result: Never      PHQ Scores 12/8/2022   PHQ2 Score 0   PHQ9 Score 0       URSULA 7 SCORE 12/29/2022   URSULA-7 Total Score 1          ASSESSMENT/PLAN:  1. 24 weeks gestation of pregnancy  S=D    2. Normal intrauterine pregnancy, antepartum  The problem list was reviewed and updated with any new issues from today's visit    Hazel Tate will monitor fetal movement daily. [x] 28 week lab panel was discussed, had cereal this am and will do Glucola NOV  [x] Initial discussion on birth plan was started,  2nd trimester packet give along with agreements      Hazel Tate was counseled regarding all of the above    The patient, Kimberley Jennings,  was seen with a total time spent of 20 minutes for the visit on this date of service by the [facility]  The time component involved both face-to-face (counseling and education) and non face-to-face time (care coordination), spent in determining the total time component.      On this date April 21, 2023,  I have spent greater than 50% of this visit:  [x] Reviewing previous notes/pre-charting  [] Reviewing test results  [] Performing necessary physical exam/evaluation  [x] Ordering medications, tests, procedures  [] Placing referrals or communicating with other HCP  [x] Documenting and updating Problem List as necessary  [] Importance of compliance with treatment plan discussed  [x] Counseling

## 2023-04-21 ENCOUNTER — ROUTINE PRENATAL (OUTPATIENT)
Dept: OBGYN CLINIC | Age: 23
End: 2023-04-21

## 2023-04-21 VITALS
HEART RATE: 84 BPM | WEIGHT: 133 LBS | BODY MASS INDEX: 22.83 KG/M2 | SYSTOLIC BLOOD PRESSURE: 106 MMHG | DIASTOLIC BLOOD PRESSURE: 54 MMHG

## 2023-04-21 DIAGNOSIS — Z3A.24 24 WEEKS GESTATION OF PREGNANCY: ICD-10-CM

## 2023-04-21 DIAGNOSIS — Z34.90 NORMAL INTRAUTERINE PREGNANCY, ANTEPARTUM: Primary | ICD-10-CM

## 2023-04-21 NOTE — PROGRESS NOTES
Pt is here today at her 24w5 prenatal visit  Pt states fetal movement is present  Pt has no concerns  Pt wants to do glucose next visit

## 2023-04-28 ENCOUNTER — CARE COORDINATION (OUTPATIENT)
Dept: OTHER | Facility: CLINIC | Age: 23
End: 2023-04-28

## 2023-04-28 NOTE — CARE COORDINATION
Ambulatory Care Coordination Note    ACM attempted third and final call to patient for introduction to Associate Care Management. HIPAA compliant message left requesting a return phone call at patient convenience. Final Unable to Reach Letter sent to patient via [employer]. No further outreach scheduled with this ACM, patient has been provided with this ACM's contact information. ACM will sign off care team at this time. Future Appointments   Date Time Provider Walter Ocampo   5/15/2023  8:50 AM PILAR Khan CNM [facility] RUSLAN Weaver, RN  Associate Care Manager   Cell: [phone]  [email or web address]

## 2023-05-04 ENCOUNTER — CARE COORDINATION (OUTPATIENT)
Dept: OTHER | Facility: CLINIC | Age: 23
End: 2023-05-04

## 2023-05-04 NOTE — CARE COORDINATION
For example, call if:  Call your doctor now or seek immediate medical care if:     You passed out (lost consciousness). You have a seizure. You have severe vaginal bleeding. You have severe pain in your belly or pelvis. You have had fluid gushing or leaking from your vagina and you know or think the  umbilical cord is bulging into your vagina. If this happens, immediately get down on  your knees so your rear end (buttocks) is higher than your head. This will decrease  the pressure on the cord until help arrives. You have signs of preeclampsia, such as:  Sudden swelling of your face, hands, or feet. New vision problems (such as dimness, blurring, or seeing spots). A severe headache. You have any vaginal bleeding. You have belly pain or cramping. You have a fever. You have had regular contractions (with or without pain) for an hour. This means that  you have 8 or more within 1 hour or 4 or more in 20 minutes after you change your  position and drink fluids. You have a sudden release of fluid from your vagina. You have low back pain or pelvic pressure that does not go away. You notice that your baby has stopped moving or is moving much less than normal.              Provided Education: related to monitoring for s/s of labor, s/s of antepartum anxiety and/or depression, red-flag s/s and precautions, importance of taking all medications as prescribed and importance of prenatal follow-up visits. Plan: Continue monthly outreaches to provide telephonic support, education and resources as needed. Discuss / follow up on: Goal progress and follow-up appointment scheduling  Summary Note: Patient reports that she is doing well. She denies any red-flag s/s or needs r/t DME, medications or appointments. She is now 26.4 weeks pregnant, due on 8/6/23. Patient's pregnancy is not complicated by anything. Patient states that she does not have new concerns or changes in her medications or history.  She did

## 2023-05-15 ENCOUNTER — ROUTINE PRENATAL (OUTPATIENT)
Dept: OBGYN CLINIC | Age: 23
End: 2023-05-15
Payer: COMMERCIAL

## 2023-05-15 ENCOUNTER — HOSPITAL ENCOUNTER (OUTPATIENT)
Age: 23
Setting detail: SPECIMEN
Discharge: HOME | End: 2023-05-15

## 2023-05-15 VITALS
SYSTOLIC BLOOD PRESSURE: 102 MMHG | BODY MASS INDEX: 23 KG/M2 | DIASTOLIC BLOOD PRESSURE: 64 MMHG | HEART RATE: 81 BPM | WEIGHT: 134 LBS

## 2023-05-15 DIAGNOSIS — Z34.90 NORMAL INTRAUTERINE PREGNANCY, ANTEPARTUM: ICD-10-CM

## 2023-05-15 DIAGNOSIS — Z23 NEED FOR TDAP VACCINATION: ICD-10-CM

## 2023-05-15 DIAGNOSIS — Z3A.28 28 WEEKS GESTATION OF PREGNANCY: ICD-10-CM

## 2023-05-15 DIAGNOSIS — Z34.90 NORMAL INTRAUTERINE PREGNANCY, ANTEPARTUM: Primary | ICD-10-CM

## 2023-05-15 LAB
ERYTHROCYTE [DISTWIDTH] IN BLOOD BY AUTOMATED COUNT: 14.5 % (ref 11.8–14.4)
GLUCOSE ADMINISTRATION: NORMAL
GLUCOSE TOLERANCE SCREEN 50G: 122 MG/DL (ref 70–135)
HCT VFR BLD AUTO: 37.6 % (ref 36.3–47.1)
HGB BLD-MCNC: 11.9 G/DL (ref 11.9–15.1)
MCH RBC QN AUTO: 35.8 PG (ref 25.2–33.5)
MCHC RBC AUTO-ENTMCNC: 31.6 G/DL (ref 28.4–34.8)
MCV RBC AUTO: 113.3 FL (ref 82.6–102.9)
NRBC AUTOMATED: 0 PER 100 WBC
PLATELET # BLD AUTO: 190 K/UL (ref 138–453)
PMV BLD AUTO: 9.6 FL (ref 8.1–13.5)
RBC # BLD AUTO: 3.32 M/UL (ref 3.95–5.11)
WBC OTHER # BLD: 9.9 K/UL (ref 3.5–11.3)

## 2023-05-15 PROCEDURE — 90715 TDAP VACCINE 7 YRS/> IM: CPT | Performed by: ADVANCED PRACTICE MIDWIFE

## 2023-05-15 PROCEDURE — 90471 IMMUNIZATION ADMIN: CPT | Performed by: ADVANCED PRACTICE MIDWIFE

## 2023-05-15 PROCEDURE — 0502F SUBSEQUENT PRENATAL CARE: CPT | Performed by: ADVANCED PRACTICE MIDWIFE

## 2023-05-15 NOTE — PROGRESS NOTES
Pt is here today at her 28w1  Pt states fetal movement is present  Pt has no concerns    50g glucola given to patient  Draw time-10:03a    After obtaining consent, and per orders of  Elisa Joseph CNM injection of Tdao given in Right deltoid by Tianna Luna MA.  Patient instructed to remain in clinic for 20 minutes afterwards, and to report any adverse reaction to me immediately       [address] [id number]  LOT [id number]  EXP 9/29/2025

## 2023-05-31 ENCOUNTER — ROUTINE PRENATAL (OUTPATIENT)
Dept: OBGYN CLINIC | Age: 23
End: 2023-05-31

## 2023-05-31 VITALS
SYSTOLIC BLOOD PRESSURE: 102 MMHG | WEIGHT: 138 LBS | DIASTOLIC BLOOD PRESSURE: 58 MMHG | HEART RATE: 90 BPM | BODY MASS INDEX: 23.69 KG/M2

## 2023-05-31 DIAGNOSIS — Z3A.30 30 WEEKS GESTATION OF PREGNANCY: ICD-10-CM

## 2023-05-31 DIAGNOSIS — Z34.90 NORMAL INTRAUTERINE PREGNANCY, ANTEPARTUM: Primary | ICD-10-CM

## 2023-05-31 NOTE — PROGRESS NOTES
SUBJECTIVE:  Nena Herrera is here for her return OB visit. She reports fetal movement. She denies vaginal bleeding. She denies vaginal discharge. She denies leaking of fluid. She denies uterine contraction activity. She denies nausea and/or vomiting. She denies retaining fluid in her extremities. She denies headache, vision changes, RUQ pain, and epigastric pain. 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 Pregravid BMI: 20.42   TW lb (8.618 kg)    Nena Herrera has received the Tdap vaccine as appropriate  Rhogam was not indicated    ASSESSMENT/PLAN:  IUP @ 30w3d  Nena Herrera will monitor fetal movement daily. [x] 28 week lab results were reviewed. Glucola 122, Hgb 11.9. [x] Fetal Kick Counts reinforced. [] Kyburz-Umanzor contractions vs  labor contractions were reviewed. [x] Signs and symptoms of Pre-Eclampsia were reviewed and discussed. [x] Initial discussion regarding birth plans was begun. [x] Given 36 week birth packet for review. S = D    Normal intrauterine pregnancy, antepartum  Normal anatomy scan  Chart has been reviewed by collaborating physician. After reviewing and updating the problem list, the chart was sent to physician for review- N/a    Return in about 2 weeks (around 2023) for OB visit with Midwives. The patient, Delilah Powell, was seen with a total time spent of 25 minutes for the visit on this date of service by the [facility]  The time component involved both face-to-face (counseling and education) and non face-to-face time (care coordination), spent in determining the total time component.       On this date 2023, I have spent greater than 50% of this visit:  [x] Reviewing previous notes/pre-charting  [x]

## 2023-05-31 NOTE — PROGRESS NOTES
Pt is here today at her 30w3 prenatal visit  Pt states fetal movement is present  Pt has no concerns

## 2023-06-03 DIAGNOSIS — Z20.89 MENINGITIS EXPOSURE: Primary | ICD-10-CM

## 2023-06-03 RX ORDER — AZITHROMYCIN 500 MG/1
500 TABLET, FILM COATED ORAL ONCE
Qty: 1 TABLET | Refills: 0 | Status: SHIPPED | OUTPATIENT
Start: 2023-06-03 | End: 2023-06-03

## 2023-06-27 ENCOUNTER — ROUTINE PRENATAL (OUTPATIENT)
Dept: OBGYN CLINIC | Age: 23
End: 2023-06-27

## 2023-06-27 VITALS
WEIGHT: 141 LBS | DIASTOLIC BLOOD PRESSURE: 60 MMHG | HEART RATE: 90 BPM | BODY MASS INDEX: 24.2 KG/M2 | SYSTOLIC BLOOD PRESSURE: 106 MMHG

## 2023-06-27 DIAGNOSIS — Z3A.34 34 WEEKS GESTATION OF PREGNANCY: ICD-10-CM

## 2023-06-27 DIAGNOSIS — Z34.90 NORMAL INTRAUTERINE PREGNANCY, ANTEPARTUM: Primary | ICD-10-CM

## 2023-06-27 PROCEDURE — 0502F SUBSEQUENT PRENATAL CARE: CPT | Performed by: ADVANCED PRACTICE MIDWIFE

## 2023-07-12 ENCOUNTER — ROUTINE PRENATAL (OUTPATIENT)
Dept: OBGYN CLINIC | Age: 23
End: 2023-07-12

## 2023-07-12 ENCOUNTER — HOSPITAL ENCOUNTER (OUTPATIENT)
Age: 23
Setting detail: SPECIMEN
Discharge: HOME | End: 2023-07-12

## 2023-07-12 VITALS
SYSTOLIC BLOOD PRESSURE: 106 MMHG | BODY MASS INDEX: 24.72 KG/M2 | DIASTOLIC BLOOD PRESSURE: 72 MMHG | WEIGHT: 144 LBS | HEART RATE: 91 BPM

## 2023-07-12 DIAGNOSIS — O26.843 UTERINE SIZE-DATE DISCREPANCY IN THIRD TRIMESTER: ICD-10-CM

## 2023-07-12 DIAGNOSIS — Z3A.36 36 WEEKS GESTATION OF PREGNANCY: ICD-10-CM

## 2023-07-12 DIAGNOSIS — Z34.90 NORMAL INTRAUTERINE PREGNANCY, ANTEPARTUM: Primary | ICD-10-CM

## 2023-07-12 DIAGNOSIS — Z34.90 NORMAL INTRAUTERINE PREGNANCY, ANTEPARTUM: ICD-10-CM

## 2023-07-12 PROCEDURE — 0502F SUBSEQUENT PRENATAL CARE: CPT | Performed by: ADVANCED PRACTICE MIDWIFE

## 2023-07-12 NOTE — PROGRESS NOTES
Pt is here today at her 36.3 pnv  Pt states fetal movement is pesent  Pt has no concerns today, gbs today
face-to-face time (care coordination), spent in determining the total time component.      On this date July 12, 2023, I have spent greater than 50% of this visit:  [x] Reviewing previous notes/pre-charting  [x] Reviewing test results  [x] Performing necessary physical exam/evaluation  [] Ordering medications, tests, procedures  [] Placing referrals or communicating with other HCP  [x] Documenting and updating Problem List as necessary  [] Importance of compliance with treatment plan discussed  [x] Counseling patient/support person  [] Coordinating care    Electronically signed by: PILAR Santo CNM

## 2023-07-13 ENCOUNTER — PROCEDURE VISIT (OUTPATIENT)
Dept: OBGYN CLINIC | Age: 23
End: 2023-07-13
Payer: COMMERCIAL

## 2023-07-13 DIAGNOSIS — O26.843 UTERINE SIZE-DATE DISCREPANCY IN THIRD TRIMESTER: ICD-10-CM

## 2023-07-13 DIAGNOSIS — Z3A.36 36 WEEKS GESTATION OF PREGNANCY: ICD-10-CM

## 2023-07-13 LAB
MICROORGANISM SPEC CULT: NORMAL
SPECIMEN DESCRIPTION: NORMAL

## 2023-07-13 PROCEDURE — 76816 OB US FOLLOW-UP PER FETUS: CPT | Performed by: OBSTETRICS & GYNECOLOGY

## 2023-07-14 ENCOUNTER — CARE COORDINATION (OUTPATIENT)
Dept: OTHER | Facility: CLINIC | Age: 23
End: 2023-07-14

## 2023-07-14 NOTE — CARE COORDINATION
Patient Current Location: Home: [address] [place] [address],  [address] Counts include 234 beds at the [facility] [address]    Last Discharge 3 Josel[address]  contacted the patient by telephone to discuss the maternity management program.  Patient agrees to care management services at this time. Verified name and  with patient as identifiers. Risk Factors Identified:  None      Needs to be reviewed by the provider   none         Method of communication with provider : none    Advance Care Planning:   Does patient have an Advance Directive:  reviewed and current. Does patient have OB/Gyn Selected? Yes    Discussed follow up appointments. If no appointment was previously scheduled, appointment scheduling offered: Yes  [facility] follow up appointment(s):   Future Appointments   Date Time Provider [address]   2023  7:30 AM Vanessa Amaya APRN - CNM Sprg Deb OB [facility]   2023  1:50 PM Pardeep Watkins APRN - CNM Sprg Deb OB [facility]   2023  9:50 AM Pardeep Watkins APRN - CNM Sprg Deb OB [facility]   2023  9:30 AM Mayo Galan APRN - CNM Sprg Deb OB [facility]   2023  9:10 AM Mayo Galan APRN - CNM Sprg Deb OB [facility]     Non-[facility] follow up appointment(s): n/a    OB History:   OB History    Para Term  AB Living   1 0 0 0 0 0   SAB IAB Ectopic Molar Multiple Live Births   0 0 0 0 0 0      # Outcome Date GA Lbr Rashad/2nd Weight Sex Delivery Anes PTL Lv   1 Current                36w5d    Medication reconciliation was performed with patient, who verbalizes understanding of administration of home medications. Advised obtaining a 90-day supply of all daily and as-needed medications. Barriers/Support system:  patient and spouse  Return to work planning? At 14 weeks PP      2nd and 3rd Trimester Focused Assessment:   Healthy behavior review  MFM referral needed?  No  Fetal movement-every day  Red flags: bleeding/leaking  Labor signs and symptoms-None  Birth planning:

## 2023-07-15 LAB
MICROORGANISM SPEC CULT: NORMAL
SPECIMEN DESCRIPTION: NORMAL

## 2023-07-18 ENCOUNTER — ROUTINE PRENATAL (OUTPATIENT)
Dept: OBGYN CLINIC | Age: 23
End: 2023-07-18

## 2023-07-18 VITALS
DIASTOLIC BLOOD PRESSURE: 60 MMHG | SYSTOLIC BLOOD PRESSURE: 102 MMHG | HEART RATE: 105 BPM | BODY MASS INDEX: 25.06 KG/M2 | WEIGHT: 146 LBS

## 2023-07-18 DIAGNOSIS — O26.843 UTERINE SIZE-DATE DISCREPANCY IN THIRD TRIMESTER: ICD-10-CM

## 2023-07-18 DIAGNOSIS — Z34.90 NORMAL INTRAUTERINE PREGNANCY, ANTEPARTUM: Primary | ICD-10-CM

## 2023-07-18 DIAGNOSIS — Z3A.37 37 WEEKS GESTATION OF PREGNANCY: ICD-10-CM

## 2023-07-18 NOTE — PROGRESS NOTES
Pt is here today at her 37w2 prenatal visit  Pt states fetal movement is present  Pt has no concerns

## 2023-07-26 ENCOUNTER — ROUTINE PRENATAL (OUTPATIENT)
Dept: OBGYN CLINIC | Age: 23
End: 2023-07-26

## 2023-07-26 VITALS
SYSTOLIC BLOOD PRESSURE: 122 MMHG | WEIGHT: 147 LBS | DIASTOLIC BLOOD PRESSURE: 68 MMHG | BODY MASS INDEX: 25.23 KG/M2 | HEART RATE: 92 BPM

## 2023-07-26 DIAGNOSIS — O26.843 UTERINE SIZE-DATE DISCREPANCY IN THIRD TRIMESTER: ICD-10-CM

## 2023-07-26 DIAGNOSIS — Z3A.38 38 WEEKS GESTATION OF PREGNANCY: Primary | ICD-10-CM

## 2023-07-26 DIAGNOSIS — Z23 NEED FOR TDAP VACCINATION: ICD-10-CM

## 2023-07-26 DIAGNOSIS — Z34.90 NORMAL INTRAUTERINE PREGNANCY, ANTEPARTUM: ICD-10-CM

## 2023-07-26 PROCEDURE — 0502F SUBSEQUENT PRENATAL CARE: CPT | Performed by: ADVANCED PRACTICE MIDWIFE

## 2023-07-26 NOTE — PROGRESS NOTES
Pt is here today at her 38w3d appt  Pt states fetal movement is present  Pt has no complaints
pain when passing urine. You have headaches. You have changes or blind spots in your eyesight. Your water breaks. You start having regular, painful contractions q5 min, lasting 1 hr  You notice a decrease in fetal movement. You have significant swelling and weight gain. You have chest pain or difficulty breathing. She was counseled regarding all of the above:    Return in about 1 week (around 8/2/2023) for Return OB. The patient, Vianey Knapp was seen with a total time spent of 20 minutes for the visit on this date of service by the [facility]  Both face-to-face (counseling and education) and non face-to-face time (care coordination), were spent in determining the total time component.      Electronically Signed By: PILAR Duff CNM

## 2023-07-28 ENCOUNTER — CARE COORDINATION (OUTPATIENT)
Dept: OTHER | Facility: CLINIC | Age: 23
End: 2023-07-28

## 2023-07-28 NOTE — CARE COORDINATION
Patient Current Location: Home: [address] [place] [address],  [address] Replaced by [facility] [address]    Last Discharge 3 Joselito  contacted the patient by telephone to discuss the maternity management program.  Patient agrees to care management services at this time. Verified name and  with patient as identifiers. Risk Factors Identified:  None      Needs to be reviewed by the provider   none         Method of communication with provider : none    Advance Care Planning:   Does patient have an Advance Directive:  reviewed and current. Does patient have OB/Gyn Selected? Yes    Discussed follow up appointments. If no appointment was previously scheduled, appointment scheduling offered: Yes  [facility] follow up appointment(s):   Future Appointments   Date Time Provider [address]   2023 10:10 AM Joselyn Womack APRN - CNM Sprg Deb OB Layvonne Saul   2023  9:30 AM Antoinette Simms APRN - CNM Sprg Deb OB [facility]   2023  9:10 AM Antoinette Simms APRN - CNM Sprg Deb OB [facility]     [facility] follow up appointment(s): n/a    OB History:   OB History    Para Term  AB Living   1 0 0 0 0 0   SAB IAB Ectopic Molar Multiple Live Births   0 0 0 0 0 0      # Outcome Date GA Lbr Rashad/2nd Weight Sex Delivery Anes PTL Lv   1 Current                38w5d    Medication reconciliation was performed with patient, who verbalizes understanding of administration of home medications. Advised obtaining a 90-day supply of all daily and as-needed medications. Barriers/Support system:  patient and spouse  Return to work planning? At 14 weeks PP      2nd and 3rd Trimester Focused Assessment:   Healthy behavior review  Fetal movement-every day  Red flags: bleeding/leaking  Labor signs and symptoms-None  Birth planning:  planne      Patient given an opportunity to ask questions and does not have any further questions or concerns at this time.  Were discharge

## 2023-08-01 ENCOUNTER — ROUTINE PRENATAL (OUTPATIENT)
Dept: OBGYN CLINIC | Age: 23
End: 2023-08-01

## 2023-08-01 VITALS
WEIGHT: 145 LBS | HEART RATE: 83 BPM | DIASTOLIC BLOOD PRESSURE: 68 MMHG | BODY MASS INDEX: 24.89 KG/M2 | SYSTOLIC BLOOD PRESSURE: 118 MMHG

## 2023-08-01 DIAGNOSIS — Z3A.39 39 WEEKS GESTATION OF PREGNANCY: Primary | ICD-10-CM

## 2023-08-01 DIAGNOSIS — O26.843 UTERINE SIZE-DATE DISCREPANCY IN THIRD TRIMESTER: ICD-10-CM

## 2023-08-01 DIAGNOSIS — Z34.90 NORMAL INTRAUTERINE PREGNANCY, ANTEPARTUM: ICD-10-CM

## 2023-08-01 PROCEDURE — 0502F SUBSEQUENT PRENATAL CARE: CPT

## 2023-08-01 NOTE — PROGRESS NOTES
Pt is here today at her 39w2 prenatal visit  Pt states fetal movement is present  Pt has no concerns
patient, Ilene Ross, was seen with a total time spent of 30 minutes for the visit on this date of service by the [facility]  The time component involved both face-to-face (counseling and education) and non face-to-face time (care coordination), spent in determining the total time component.      On this date August 1, 2023, I have spent greater than 50% of this visit:  [x] Reviewing previous notes/pre-charting  [x] Reviewing test results  [x] Performing necessary physical exam/evaluation  [] Ordering medications, tests, procedures  [] Placing referrals or communicating with other HCP  [x] Documenting and updating Problem List as necessary  [] Importance of compliance with treatment plan discussed  [x] Counseling patient/support person  [] Coordinating care    Electronically signed by: PILAR Trivedi CNM

## 2023-08-08 ENCOUNTER — HOSPITAL ENCOUNTER (INPATIENT)
Age: 23
LOS: 2 days | Discharge: HOME | End: 2023-08-10
Attending: OBSTETRICS & GYNECOLOGY | Admitting: OBSTETRICS & GYNECOLOGY
Payer: COMMERCIAL

## 2023-08-08 ENCOUNTER — ROUTINE PRENATAL (OUTPATIENT)
Dept: OBGYN CLINIC | Age: 23
End: 2023-08-08

## 2023-08-08 ENCOUNTER — ANESTHESIA (OUTPATIENT)
Dept: LABOR AND DELIVERY | Age: 23
End: 2023-08-08
Payer: COMMERCIAL

## 2023-08-08 ENCOUNTER — ANESTHESIA EVENT (OUTPATIENT)
Dept: LABOR AND DELIVERY | Age: 23
End: 2023-08-08
Payer: COMMERCIAL

## 2023-08-08 VITALS
WEIGHT: 148 LBS | HEART RATE: 96 BPM | BODY MASS INDEX: 25.4 KG/M2 | SYSTOLIC BLOOD PRESSURE: 110 MMHG | DIASTOLIC BLOOD PRESSURE: 70 MMHG

## 2023-08-08 DIAGNOSIS — Z3A.40 40 WEEKS GESTATION OF PREGNANCY: Primary | ICD-10-CM

## 2023-08-08 DIAGNOSIS — Z34.90 NORMAL INTRAUTERINE PREGNANCY, ANTEPARTUM: ICD-10-CM

## 2023-08-08 DIAGNOSIS — O26.843 UTERINE SIZE-DATE DISCREPANCY IN THIRD TRIMESTER: ICD-10-CM

## 2023-08-08 LAB
ABO + RH BLD: NORMAL
AMPHET UR QL SCN: NEGATIVE
ARM BAND NUMBER: NORMAL
BARBITURATES UR QL SCN: NEGATIVE
BASOPHILS # BLD: 0.03 K/UL (ref 0–0.2)
BASOPHILS NFR BLD: 0 % (ref 0–2)
BENZODIAZ UR QL: NEGATIVE
BLOOD BANK SAMPLE EXPIRATION: NORMAL
BLOOD GROUP ANTIBODIES SERPL: NEGATIVE
CANNABINOIDS UR QL SCN: NEGATIVE
COCAINE UR QL SCN: NEGATIVE
EOSINOPHIL # BLD: <0.03 K/UL (ref 0–0.44)
EOSINOPHILS RELATIVE PERCENT: 0 % (ref 1–4)
ERYTHROCYTE [DISTWIDTH] IN BLOOD BY AUTOMATED COUNT: 12.8 % (ref 11.8–14.4)
FENTANYL UR QL: NEGATIVE
HCT VFR BLD AUTO: 38.6 % (ref 36.3–47.1)
HGB BLD-MCNC: 13.4 G/DL (ref 11.9–15.1)
IMM GRANULOCYTES # BLD AUTO: <0.03 K/UL (ref 0–0.3)
IMM GRANULOCYTES NFR BLD: 0 %
LYMPHOCYTES NFR BLD: 1.13 K/UL (ref 1.1–3.7)
LYMPHOCYTES RELATIVE PERCENT: 14 % (ref 24–43)
MCH RBC QN AUTO: 35.3 PG (ref 25.2–33.5)
MCHC RBC AUTO-ENTMCNC: 34.7 G/DL (ref 28.4–34.8)
MCV RBC AUTO: 101.6 FL (ref 82.6–102.9)
METHADONE UR QL: NEGATIVE
MONOCYTES NFR BLD: 0.52 K/UL (ref 0.1–1.2)
MONOCYTES NFR BLD: 6 % (ref 3–12)
NEUTROPHILS NFR BLD: 80 % (ref 36–65)
NEUTS SEG NFR BLD: 6.57 K/UL (ref 1.5–8.1)
NRBC BLD-RTO: 0 PER 100 WBC
OPIATES UR QL SCN: NEGATIVE
OXYCODONE UR QL SCN: NEGATIVE
PCP UR QL SCN: NEGATIVE
PLATELET # BLD AUTO: 178 K/UL (ref 138–453)
PMV BLD AUTO: 10.2 FL (ref 8.1–13.5)
RBC # BLD AUTO: 3.8 M/UL (ref 3.95–5.11)
T PALLIDUM AB SER QL IA: NONREACTIVE
TEST INFORMATION: NORMAL
WBC OTHER # BLD: 8.3 K/UL (ref 3.5–11.3)

## 2023-08-08 PROCEDURE — 3700000025 EPIDURAL BLOCK: Performed by: ANESTHESIOLOGY

## 2023-08-08 PROCEDURE — 10907ZC DRAINAGE OF AMNIOTIC FLUID, THERAPEUTIC FROM PRODUCTS OF CONCEPTION, VIA NATURAL OR ARTIFICIAL OPENING: ICD-10-PCS | Performed by: OBSTETRICS & GYNECOLOGY

## 2023-08-08 PROCEDURE — 80307 DRUG TEST PRSMV CHEM ANLYZR: CPT

## 2023-08-08 PROCEDURE — 3E0P7VZ INTRODUCTION OF HORMONE INTO FEMALE REPRODUCTIVE, VIA NATURAL OR ARTIFICIAL OPENING: ICD-10-PCS | Performed by: OBSTETRICS & GYNECOLOGY

## 2023-08-08 PROCEDURE — 86780 TREPONEMA PALLIDUM: CPT

## 2023-08-08 PROCEDURE — 2500000003 HC RX 250 WO HCPCS: Performed by: ANESTHESIOLOGY

## 2023-08-08 PROCEDURE — 6370000000 HC RX 637 (ALT 250 FOR IP)

## 2023-08-08 PROCEDURE — 1220000000 HC SEMI PRIVATE OB R&B

## 2023-08-08 PROCEDURE — 6360000002 HC RX W HCPCS

## 2023-08-08 PROCEDURE — 36415 COLL VENOUS BLD VENIPUNCTURE: CPT

## 2023-08-08 PROCEDURE — 6360000002 HC RX W HCPCS: Performed by: ANESTHESIOLOGY

## 2023-08-08 PROCEDURE — 0502F SUBSEQUENT PRENATAL CARE: CPT

## 2023-08-08 PROCEDURE — 3E033VJ INTRODUCTION OF OTHER HORMONE INTO PERIPHERAL VEIN, PERCUTANEOUS APPROACH: ICD-10-PCS | Performed by: OBSTETRICS & GYNECOLOGY

## 2023-08-08 PROCEDURE — 6360000002 HC RX W HCPCS: Performed by: NURSE ANESTHETIST, CERTIFIED REGISTERED

## 2023-08-08 PROCEDURE — 3E0DXGC INTRODUCTION OF OTHER THERAPEUTIC SUBSTANCE INTO MOUTH AND PHARYNX, EXTERNAL APPROACH: ICD-10-PCS | Performed by: OBSTETRICS & GYNECOLOGY

## 2023-08-08 PROCEDURE — 2500000003 HC RX 250 WO HCPCS: Performed by: NURSE ANESTHETIST, CERTIFIED REGISTERED

## 2023-08-08 PROCEDURE — 86900 BLOOD TYPING SEROLOGIC ABO: CPT

## 2023-08-08 PROCEDURE — 85025 COMPLETE CBC W/AUTO DIFF WBC: CPT

## 2023-08-08 PROCEDURE — 6370000000 HC RX 637 (ALT 250 FOR IP): Performed by: ADVANCED PRACTICE MIDWIFE

## 2023-08-08 PROCEDURE — 2580000003 HC RX 258

## 2023-08-08 PROCEDURE — 86901 BLOOD TYPING SEROLOGIC RH(D): CPT

## 2023-08-08 PROCEDURE — 86850 RBC ANTIBODY SCREEN: CPT

## 2023-08-08 RX ORDER — SODIUM CHLORIDE, SODIUM LACTATE, POTASSIUM CHLORIDE, AND CALCIUM CHLORIDE .6; .31; .03; .02 G/100ML; G/100ML; G/100ML; G/100ML
1000 INJECTION, SOLUTION INTRAVENOUS PRN
Status: DISCONTINUED | OUTPATIENT
Start: 2023-08-08 | End: 2023-08-09 | Stop reason: HOSPADM

## 2023-08-08 RX ORDER — SODIUM CHLORIDE 0.9 % (FLUSH) 0.9 %
5-40 SYRINGE (ML) INJECTION PRN
Status: DISCONTINUED | OUTPATIENT
Start: 2023-08-08 | End: 2023-08-09 | Stop reason: HOSPADM

## 2023-08-08 RX ORDER — SODIUM CHLORIDE 9 MG/ML
25 INJECTION, SOLUTION INTRAVENOUS PRN
Status: DISCONTINUED | OUTPATIENT
Start: 2023-08-08 | End: 2023-08-09 | Stop reason: HOSPADM

## 2023-08-08 RX ORDER — ROPIVACAINE HYDROCHLORIDE 2 MG/ML
INJECTION, SOLUTION EPIDURAL; INFILTRATION; PERINEURAL
Status: COMPLETED
Start: 2023-08-08 | End: 2023-08-09

## 2023-08-08 RX ORDER — SODIUM CHLORIDE, SODIUM LACTATE, POTASSIUM CHLORIDE, CALCIUM CHLORIDE 600; 310; 30; 20 MG/100ML; MG/100ML; MG/100ML; MG/100ML
INJECTION, SOLUTION INTRAVENOUS CONTINUOUS
Status: DISCONTINUED | OUTPATIENT
Start: 2023-08-08 | End: 2023-08-09

## 2023-08-08 RX ORDER — LIDOCAINE HYDROCHLORIDE AND EPINEPHRINE 15; 5 MG/ML; UG/ML
INJECTION, SOLUTION EPIDURAL PRN
Status: DISCONTINUED | OUTPATIENT
Start: 2023-08-08 | End: 2023-08-09 | Stop reason: SDUPTHER

## 2023-08-08 RX ORDER — ONDANSETRON 2 MG/ML
4 INJECTION INTRAMUSCULAR; INTRAVENOUS EVERY 6 HOURS PRN
Status: DISCONTINUED | OUTPATIENT
Start: 2023-08-08 | End: 2023-08-09 | Stop reason: HOSPADM

## 2023-08-08 RX ORDER — SODIUM CHLORIDE 0.9 % (FLUSH) 0.9 %
5-40 SYRINGE (ML) INJECTION EVERY 12 HOURS SCHEDULED
Status: DISCONTINUED | OUTPATIENT
Start: 2023-08-08 | End: 2023-08-09 | Stop reason: HOSPADM

## 2023-08-08 RX ORDER — DIPHENHYDRAMINE HCL 25 MG
25 TABLET ORAL EVERY 4 HOURS PRN
Status: DISCONTINUED | OUTPATIENT
Start: 2023-08-08 | End: 2023-08-09 | Stop reason: HOSPADM

## 2023-08-08 RX ORDER — ACETAMINOPHEN 500 MG
1000 TABLET ORAL EVERY 6 HOURS PRN
Status: DISCONTINUED | OUTPATIENT
Start: 2023-08-08 | End: 2023-08-09 | Stop reason: HOSPADM

## 2023-08-08 RX ORDER — ROPIVACAINE HYDROCHLORIDE 2 MG/ML
INJECTION, SOLUTION EPIDURAL; INFILTRATION; PERINEURAL PRN
Status: DISCONTINUED | OUTPATIENT
Start: 2023-08-08 | End: 2023-08-09 | Stop reason: SDUPTHER

## 2023-08-08 RX ORDER — SODIUM CHLORIDE, SODIUM LACTATE, POTASSIUM CHLORIDE, AND CALCIUM CHLORIDE .6; .31; .03; .02 G/100ML; G/100ML; G/100ML; G/100ML
500 INJECTION, SOLUTION INTRAVENOUS PRN
Status: DISCONTINUED | OUTPATIENT
Start: 2023-08-08 | End: 2023-08-09 | Stop reason: HOSPADM

## 2023-08-08 RX ORDER — EPHEDRINE SULFATE 50 MG/ML
10 INJECTION INTRAVENOUS ONCE
Status: COMPLETED | OUTPATIENT
Start: 2023-08-08 | End: 2023-08-08

## 2023-08-08 RX ORDER — NALOXONE HYDROCHLORIDE 0.4 MG/ML
INJECTION, SOLUTION INTRAMUSCULAR; INTRAVENOUS; SUBCUTANEOUS PRN
Status: DISCONTINUED | OUTPATIENT
Start: 2023-08-08 | End: 2023-08-09 | Stop reason: HOSPADM

## 2023-08-08 RX ORDER — LIDOCAINE HYDROCHLORIDE 10 MG/ML
INJECTION, SOLUTION EPIDURAL; INFILTRATION; INTRACAUDAL; PERINEURAL PRN
Status: DISCONTINUED | OUTPATIENT
Start: 2023-08-08 | End: 2023-08-09 | Stop reason: SDUPTHER

## 2023-08-08 RX ORDER — SEVOFLURANE 250 ML/250ML
1 LIQUID RESPIRATORY (INHALATION) CONTINUOUS PRN
Status: DISCONTINUED | OUTPATIENT
Start: 2023-08-08 | End: 2023-08-09

## 2023-08-08 RX ADMIN — ROPIVACAINE HYDROCHLORIDE 10 ML/HR: 2 INJECTION, SOLUTION EPIDURAL; INFILTRATION at 21:29

## 2023-08-08 RX ADMIN — EPHEDRINE SULFATE 10 MG: 50 INJECTION, SOLUTION INTRAVENOUS at 22:14

## 2023-08-08 RX ADMIN — SODIUM CHLORIDE, PRESERVATIVE FREE 10 ML: 5 INJECTION INTRAVENOUS at 11:10

## 2023-08-08 RX ADMIN — SODIUM CHLORIDE, POTASSIUM CHLORIDE, SODIUM LACTATE AND CALCIUM CHLORIDE: 600; 310; 30; 20 INJECTION, SOLUTION INTRAVENOUS at 19:58

## 2023-08-08 RX ADMIN — ROPIVACAINE HYDROCHLORIDE 8 ML: 2 INJECTION, SOLUTION EPIDURAL; INFILTRATION; PERINEURAL at 21:29

## 2023-08-08 RX ADMIN — LIDOCAINE HYDROCHLORIDE 2 ML: 10 INJECTION, SOLUTION EPIDURAL; INFILTRATION; INTRACAUDAL; PERINEURAL at 21:18

## 2023-08-08 RX ADMIN — Medication 25 MCG: at 16:23

## 2023-08-08 RX ADMIN — Medication 1 MILLI-UNITS/MIN: at 23:00

## 2023-08-08 RX ADMIN — SODIUM CHLORIDE, POTASSIUM CHLORIDE, SODIUM LACTATE AND CALCIUM CHLORIDE: 600; 310; 30; 20 INJECTION, SOLUTION INTRAVENOUS at 11:49

## 2023-08-08 RX ADMIN — SODIUM CHLORIDE, POTASSIUM CHLORIDE, SODIUM LACTATE AND CALCIUM CHLORIDE 1000 ML: 600; 310; 30; 20 INJECTION, SOLUTION INTRAVENOUS at 21:39

## 2023-08-08 RX ADMIN — LIDOCAINE HYDROCHLORIDE,EPINEPHRINE BITARTRATE 5 ML: 15; .005 INJECTION, SOLUTION EPIDURAL; INFILTRATION; INTRACAUDAL; PERINEURAL at 21:21

## 2023-08-08 RX ADMIN — Medication 50 MCG: at 11:25

## 2023-08-08 ASSESSMENT — PAIN SCALES - GENERAL: PAINLEVEL_OUTOF10: 6

## 2023-08-08 NOTE — CARE COORDINATION
ANTEPARTUM NOTE    40 weeks gestation of pregnancy [Z3A.40]    Murtaza Phillips was admitted to L&D on 8/8/23 for IOL @ 40w2d due to decel at [facility] and post dates    OB GYN Provider: mmw    Will meet with patient after delivery to verify name/address/phone/insurance and discuss discharge planning. Anticipate DC home 2 nights after vaginal delivery or 4 nights after C/S delivery as long as hemodynamically stable.

## 2023-08-08 NOTE — H&P
[facility] Obstetrics History and Physical  2023  11:27 AM      SUBJECTIVE:    CHIEF COMPLAINT:  sent from antepartum testing after prolonged decel on NST in office. HISTORY OF PRESENT ILLNESS:      The patient is a 20 y.o. female at 45w3d. Marlyn Mancuso presents with a chief complaint as above and is being admitted for induction    Course of pregnancy complicated by:     Patient Active Problem List   Diagnosis    Acute cystitis with hematuria    Normal intrauterine pregnancy, antepartum    Tdap vaccine: RECEIVED    Uterine size-date discrepancy in third trimester    40 weeks gestation of pregnancy         OBJECTIVE:     Estimated Due Date:   Estimated Date of Delivery: 23   Patient's last menstrual period was 10/30/2022 (exact date). Confirmed by:      PRENATAL LABS:    Blood Type/Rh: A pos  Antibody Screen: negative  Hemoglobin, Hematocrit, Platelets: 79.9 / 86.7 / 178  Rubella: immune  T. Pallidum, IgG: non-reactive   Hepatitis B Surface Antigen: negative   Hepatitis C Antibody: negative  HIV: non-reactive   Sickle Cell Screen: not done  Gonorrhea: negative  Chlamydia: negative  Urine culture: negative, date: 22    1 hour (early) Glucose Test:   1 hour Glucose Test: 122  3 hour Glucose Tolerance Test:   Group B Strep: negative  Cystic Fibrosis Screen: negative  First Trimester Screen: patient declined  MSAFP/Multiple Markers: patient declined  Non-Invasive Prenatal Testing: normal     Steroids:  no  Date:    Date:      PAST OB HISTORY:  OB History    Para Term  AB Living   1 0 0 0 0 0   SAB IAB Ectopic Molar Multiple Live Births   0 0 0 0 0 0      # Outcome Date GA Lbr Rashad/2nd Weight Sex Delivery Anes PTL Lv   1 Current                Past Medical History:        Diagnosis Date    Hypotension        Past Surgical History:        Procedure Laterality Date    FOOT SURGERY Left        Allergies:    Patient has no known allergies.     Social History:    Social Category 1  Dr Estela Fatima aware of admission and plan.

## 2023-08-08 NOTE — DISCHARGE SUMMARY
Obstetric Discharge Summary  [address]    Patient Name: Helen Vigil  Patient : 2000  Primary Care Physician: No primary care provider on file. Admit Date: 2023    Principal Diagnosis: IUP at 40w2d, admitted for IOL 2/2 Category 2 Tracing     Her pregnancy has been complicated by:   Patient Active Problem List   Diagnosis    Acute cystitis with hematuria    Normal intrauterine pregnancy, antepartum    Tdap vaccine: RECEIVED    Uterine size-date discrepancy in third trimester    40 weeks gestation of pregnancy    VAVD 23 M Apg  Wt 7#3       Infection Present?: No  Hospital Acquired: No    Surgical Operations & Procedures:  Analgesia: epidural  Delivery Type: Vacuum Assisted Vaginal Delivery: See Labor and Delivery Summary   Laceration(s): Second degree laceration. Suture used for repair:  Vicryl 3.0. Consultations: NICU and Anesthesia    Pertinent Findings & Procedures:   Helen Vigil is a 20 y.o. female  at 40w2d admitted for IOL 2/2 Category 2 Tracing; received Cytotec 50 PO x1, Cytotec 25 mcg PO x1, epidural, pitocin, AROM (clr). She delivered by vacuum extraction  a Live Born infant on 23. Information for the patient's :  Larina Minium [[id number]]   male   Birth Weight: 7 lb 3 oz (3.26 kg)     Apgars: 8 at 1 minute and 9 at 5 minutes.        Postpartum course: normal.    PPD#1: Hgb 9.8    Course of patient: uncomplicated    Discharge to: Home    Readmission planned: no     Indication for 6 week PP 2 hour GTT?: no     Recommendations on Discharge:     Medications:      Medication List        START taking these medications      acetaminophen 500 MG tablet  Commonly known as: TYLENOL  Take 2 tablets by mouth every 6 hours as needed for Pain     ferrous sulfate 325 (65 Fe) MG EC tablet  Commonly known as: Fe Tabs  Take 1 tablet by mouth 2 times daily     ibuprofen 600 MG tablet  Commonly known as: ADVIL;MOTRIN  Take 1 tablet by

## 2023-08-08 NOTE — PROGRESS NOTES
SUBJECTIVE:  Haily Joiner is here for her routine OB visit. She reports fetal movement. She denies vaginal bleeding. She denies leaking of fluid. She denies vaginal discharge. She denies uterine contraction activity. She denies nausea and/or vomiting. She denies retaining fluid in her extremities. She denies headache, vision changes, RUQ pain, epigastric pain, and swelling. Haily Joiner reports feeling well overall, just fatigued. Patient was concerned about US finding yesterday during her BPP. CNM discussed finding of Bilateral hydroceles with Dilated renal pelvises previously appreciated. CNM discussed Dr. Jaquan Arcos recommendation of delivery before 41 weeks. OBJECTIVE:   Blood pressure 110/70, pulse 96, weight 148 lb (67.1 kg), last menstrual period 10/30/2022. TW lb (13.2 kg)    SVE: 1//-2     0 1 2 3 Patient    Dilation Closed 1-2 3-4 5-6 1    Effacement 0-30 40-50 60-70 >80 0    Station -3 -2 -1/0 +1/+2 1    Consistency Firm Med Soft  1    Position Post Mid Ant  0   TOTAL        3     Chaperone for Intimate Exam  Chaperone was offered as part of the rooming process. Patient declined and agrees to continue with exam without a chaperone. Chaperone: N/A     NST:   Baseline:  140 bpm  Variability:  Moderate  Accelerations:  Present  Decelerations: Present, prolonged deceleration  Fetal Movement:  Perceived by patient during NST  Contractions: patient denies contractions, contractions Absent  on toco.  Interpretation: Cat 2    ASSESSMENT/PLAN:  IUP @ 40w2d  Haily Joiner will monitor for fetal movements daily. Fetal Kick Count was reinforced. She was instructed to lay on her left side 20 minutes after eating and count movements for up to 2 hours with a target value of 10 movements. Instructed to notify office if she does not make the target. She reports adequate kick counts.   GBS testing was completed and reviewed Yes  She received Tdap vaccine Yes  She received flu vaccine No  Signs of labor were

## 2023-08-08 NOTE — PROGRESS NOTES
Pt is here today at her 40w2 prenatal visit  Pt states fetal movement is present  Pt has no concerns

## 2023-08-09 LAB
ERYTHROCYTE [DISTWIDTH] IN BLOOD BY AUTOMATED COUNT: 13 % (ref 11.8–14.4)
HCT VFR BLD AUTO: 29.2 % (ref 36.3–47.1)
HGB BLD-MCNC: 9.8 G/DL (ref 11.9–15.1)
MCH RBC QN AUTO: 35.1 PG (ref 25.2–33.5)
MCHC RBC AUTO-ENTMCNC: 33.6 G/DL (ref 28.4–34.8)
MCV RBC AUTO: 104.7 FL (ref 82.6–102.9)
NRBC BLD-RTO: 0 PER 100 WBC
PLATELET # BLD AUTO: 134 K/UL (ref 138–453)
PMV BLD AUTO: 10.1 FL (ref 8.1–13.5)
RBC # BLD AUTO: 2.79 M/UL (ref 3.95–5.11)
WBC OTHER # BLD: 17.8 K/UL (ref 3.5–11.3)

## 2023-08-09 PROCEDURE — 85027 COMPLETE CBC AUTOMATED: CPT

## 2023-08-09 PROCEDURE — 36415 COLL VENOUS BLD VENIPUNCTURE: CPT

## 2023-08-09 PROCEDURE — 2580000003 HC RX 258

## 2023-08-09 PROCEDURE — 1220000000 HC SEMI PRIVATE OB R&B

## 2023-08-09 PROCEDURE — 6370000000 HC RX 637 (ALT 250 FOR IP)

## 2023-08-09 PROCEDURE — 6360000002 HC RX W HCPCS

## 2023-08-09 PROCEDURE — 99024 POSTOP FOLLOW-UP VISIT: CPT | Performed by: ADVANCED PRACTICE MIDWIFE

## 2023-08-09 PROCEDURE — 2580000003 HC RX 258: Performed by: STUDENT IN AN ORGANIZED HEALTH CARE EDUCATION/TRAINING PROGRAM

## 2023-08-09 PROCEDURE — 0KQM0ZZ REPAIR PERINEUM MUSCLE, OPEN APPROACH: ICD-10-PCS | Performed by: OBSTETRICS & GYNECOLOGY

## 2023-08-09 PROCEDURE — 7200000001 HC VAGINAL DELIVERY

## 2023-08-09 RX ORDER — ACETAMINOPHEN 500 MG
1000 TABLET ORAL EVERY 6 HOURS PRN
Status: DISCONTINUED | OUTPATIENT
Start: 2023-08-09 | End: 2023-08-10 | Stop reason: HOSPADM

## 2023-08-09 RX ORDER — SODIUM CHLORIDE 0.9 % (FLUSH) 0.9 %
5-40 SYRINGE (ML) INJECTION EVERY 12 HOURS SCHEDULED
Status: DISCONTINUED | OUTPATIENT
Start: 2023-08-09 | End: 2023-08-10 | Stop reason: HOSPADM

## 2023-08-09 RX ORDER — IBUPROFEN 600 MG/1
600 TABLET ORAL EVERY 6 HOURS PRN
Qty: 60 TABLET | Refills: 1 | Status: SHIPPED | OUTPATIENT
Start: 2023-08-09

## 2023-08-09 RX ORDER — SODIUM CHLORIDE, SODIUM LACTATE, POTASSIUM CHLORIDE, AND CALCIUM CHLORIDE .6; .31; .03; .02 G/100ML; G/100ML; G/100ML; G/100ML
500 INJECTION, SOLUTION INTRAVENOUS ONCE
Status: COMPLETED | OUTPATIENT
Start: 2023-08-09 | End: 2023-08-09

## 2023-08-09 RX ORDER — ONDANSETRON 2 MG/ML
4 INJECTION INTRAMUSCULAR; INTRAVENOUS EVERY 4 HOURS PRN
Status: DISCONTINUED | OUTPATIENT
Start: 2023-08-09 | End: 2023-08-10 | Stop reason: HOSPADM

## 2023-08-09 RX ORDER — LANOLIN ALCOHOL/MO/W.PET/CERES
325 CREAM (GRAM) TOPICAL 2 TIMES DAILY
Qty: 90 TABLET | Refills: 3 | Status: SHIPPED | OUTPATIENT
Start: 2023-08-09

## 2023-08-09 RX ORDER — HYDROCORTISONE 25 MG/G
CREAM TOPICAL
Status: DISCONTINUED | OUTPATIENT
Start: 2023-08-09 | End: 2023-08-10 | Stop reason: HOSPADM

## 2023-08-09 RX ORDER — SENNOSIDES A AND B 8.6 MG/1
1 TABLET, FILM COATED ORAL 2 TIMES DAILY
Qty: 60 TABLET | Refills: 11 | Status: SHIPPED | OUTPATIENT
Start: 2023-08-09 | End: 2024-08-08

## 2023-08-09 RX ORDER — IBUPROFEN 600 MG/1
600 TABLET ORAL EVERY 6 HOURS PRN
Status: DISCONTINUED | OUTPATIENT
Start: 2023-08-09 | End: 2023-08-10 | Stop reason: HOSPADM

## 2023-08-09 RX ORDER — DOCUSATE SODIUM 100 MG/1
100 CAPSULE, LIQUID FILLED ORAL 2 TIMES DAILY
Status: DISCONTINUED | OUTPATIENT
Start: 2023-08-09 | End: 2023-08-10 | Stop reason: HOSPADM

## 2023-08-09 RX ORDER — SIMETHICONE 80 MG
80 TABLET,CHEWABLE ORAL EVERY 6 HOURS PRN
Status: DISCONTINUED | OUTPATIENT
Start: 2023-08-09 | End: 2023-08-10 | Stop reason: HOSPADM

## 2023-08-09 RX ORDER — BISACODYL 10 MG
10 SUPPOSITORY, RECTAL RECTAL DAILY PRN
Status: DISCONTINUED | OUTPATIENT
Start: 2023-08-09 | End: 2023-08-10 | Stop reason: HOSPADM

## 2023-08-09 RX ORDER — SODIUM CHLORIDE 9 MG/ML
INJECTION, SOLUTION INTRAVENOUS PRN
Status: DISCONTINUED | OUTPATIENT
Start: 2023-08-09 | End: 2023-08-10 | Stop reason: HOSPADM

## 2023-08-09 RX ORDER — LANOLIN 72 %
OINTMENT (GRAM) TOPICAL PRN
Status: DISCONTINUED | OUTPATIENT
Start: 2023-08-09 | End: 2023-08-10 | Stop reason: HOSPADM

## 2023-08-09 RX ORDER — SODIUM CHLORIDE 0.9 % (FLUSH) 0.9 %
5-40 SYRINGE (ML) INJECTION PRN
Status: DISCONTINUED | OUTPATIENT
Start: 2023-08-09 | End: 2023-08-10 | Stop reason: HOSPADM

## 2023-08-09 RX ORDER — ACETAMINOPHEN 500 MG
1000 TABLET ORAL EVERY 6 HOURS PRN
Qty: 120 TABLET | Refills: 1 | Status: SHIPPED | OUTPATIENT
Start: 2023-08-09

## 2023-08-09 RX ADMIN — DOCUSATE SODIUM 100 MG: 100 CAPSULE, LIQUID FILLED ORAL at 11:09

## 2023-08-09 RX ADMIN — IBUPROFEN 600 MG: 600 TABLET ORAL at 20:12

## 2023-08-09 RX ADMIN — IBUPROFEN 600 MG: 600 TABLET ORAL at 11:09

## 2023-08-09 RX ADMIN — ROPIVACAINE HYDROCHLORIDE 10 ML/HR: 2 INJECTION, SOLUTION EPIDURAL; INFILTRATION at 06:01

## 2023-08-09 RX ADMIN — Medication 166.7 ML: at 06:54

## 2023-08-09 RX ADMIN — SODIUM CHLORIDE, PRESERVATIVE FREE 10 ML: 5 INJECTION INTRAVENOUS at 20:12

## 2023-08-09 RX ADMIN — ACETAMINOPHEN 1000 MG: 500 TABLET ORAL at 08:38

## 2023-08-09 RX ADMIN — SODIUM CHLORIDE, POTASSIUM CHLORIDE, SODIUM LACTATE AND CALCIUM CHLORIDE: 600; 310; 30; 20 INJECTION, SOLUTION INTRAVENOUS at 01:39

## 2023-08-09 RX ADMIN — BENZOCAINE AND LEVOMENTHOL: 200; 5 SPRAY TOPICAL at 20:12

## 2023-08-09 RX ADMIN — SODIUM CHLORIDE, POTASSIUM CHLORIDE, SODIUM LACTATE AND CALCIUM CHLORIDE 500 ML: 600; 310; 30; 20 INJECTION, SOLUTION INTRAVENOUS at 11:41

## 2023-08-09 ASSESSMENT — PAIN SCALES - GENERAL
PAINLEVEL_OUTOF10: 3
PAINLEVEL_OUTOF10: 3
PAINLEVEL_OUTOF10: 4

## 2023-08-09 NOTE — CARE COORDINATION
Social Work     Sw reviewed medical record (current active problem list) and tox screens and found no current concerns. Sw spoke with mom briefly to explain Sw role, inquire if any needs or concerns, and provide safe sleep education and discuss. Mom denied any needs or questions and informs baby has a safe sleep environment (crib and nikitat). Mom denied any current s/s of anxiety or depression and is aware to reach out to OB if any s/s occur after dc. Mom reports a really good support system and denied any current questions or needs. Mom reports this is her 1st baby. Mom states ped will be [facility]. Sw encouraged mom to reach out if any issues or concerns arise.

## 2023-08-09 NOTE — FLOWSHEET NOTE
2053-HAILEY Ponce notified patient requests epidural.  2106-HAILEY Ponce, at bedside. Epidural procedure explained, risks discussed. Pt verbalizes consent for epidural.   2109-patient positioned for epidural. 2110- Time out completed. 2113- Prep. 2115- local given per CRNA.  2120-catheter placed. 2121- test dose given. Epidural catheter taped and secured per anesthesia. 2126- to low fowlers with left uterine displacement. 2132- loading dose givenvia pump. Pt tolerated procedure well.

## 2023-08-09 NOTE — LACTATION NOTE
Lactation round made in recovery. Mother attempting to put baby to breast on right breast in cradle. Baby is not latching. Colostrum hand expressed and offered to baby. Baby not interested. Reviewed hunger signs and to call lactation. BF Packet given.

## 2023-08-09 NOTE — L&D DELIVERY NOTE
[facility] [place] Pending [facility] [[id number]]      Labor Events     Labor: No   Steroids: None  Cervical Ripening Date/Time:  23 11:25:00   Cervical Ripening Type: Misoprostol  Antibiotics Received during Labor: No  Rupture Identifier: Sac 1  Rupture Date/Time:  23 01:10:00   Rupture Type: AROM  Fluid Color: Clear  Fluid Odor: None  Fluid Volume: Moderate  Induction: Misoprostol, Oxytocin  Augmentation: AROM  Labor Complications: None              Anesthesia    Method: Epidural       Labor Event Times      Labor onset date/time:  23 01:10:00     Dilation complete date/time:  23 03:36:00 EDT     Start pushing date/time:  2023 03:57:00   Decision date/time (emergent ):            Delivery Details      Delivery Date: 23 Delivery Time: 06:50:46   Delivery Type: Vaginal, Vacuum (Extractor)              [place] Presentation    Presentation: Vertex       Shoulder Dystocia    Shoulder Dystocia Present?: No       Assisted Delivery Details    Forceps Attempted?: No  Vacuum Extractor Attempted?: Yes  Indications: Maternal Fatigue   Vacuum Type: Kiwi   Vacuum Application Location: Low   First Attempt Time Vacuum Applied: 1264 EDT    First Attempt Time Vacuum Removed: 9195 EDT                     Number of Pop Offs: 0      Number of Pulls: 3    Total Vacuum Application Time: 3 minutes   Vacuum Applied By: DR. Neo Michael    Failed?: No          Cord    Vessels: 3 Vessels  Complications: None  Delayed Cord Clamping?: Yes  Cord Clamped Date/Time: 2023 06:52:33  Cord Blood Disposition: Lab, Refrigerator  Gases Sent?: Yes              Placenta    Date/Time: 2023 06:54:52  Removal: Spontaneous  Appearance: Intact  Disposition: Discarded       Lacerations    Episiotomy: None  Perineal Lacerations: 2nd  Other Lacerations: no non-perineal laceration  Number of Repair Packets: 2       Vaginal Counts    Initial Count Personnel: [facility]  Initial Count Verified By: DR. Maya Markham

## 2023-08-09 NOTE — FLOWSHEET NOTE
Pt educated on the use of self-administered  nitrous oxide for pain control and side effects. Pt educated on when and how to use the mask appropriately. Pt educated that she is the only person allowed to hold the mask to her face and that no one should prop the mask against her face. Pt also educated that she is the only person in the room allowed to use the mask. Pt informed that she cannot be out of bed without a trained support person with her. Pt completed a return demonstration of the use of nitrous oxide and all questions and concerns were addressed. RN verified the presence of a signed agreement form for the nitrous oxide. Pre-intervention VS, assessment, and FHT'st as charted. Nitrous oxide and oxygen at a 50-50 mix was initiated at 2037. RN remains at bedside for the first 15 mins post initiation. Pt has experienced no side effects at this time.

## 2023-08-09 NOTE — CARE COORDINATION
CASE MANAGEMENT POST-PARTUM TRANSITIONAL CARE PLAN    40 weeks gestation of pregnancy [Z3A.40]    [facility] Naveed De at her bedside to discuss DCP. She is S/P VAVD on 2023    Writer verified address/phone number correct on facesheet. She states she lives with her  Андрей Beckwith. Naveed De verbalized no difficulties with transportation to and from doctors appointments or with paying for medications upon discharge home. [employer] insurance correct. Writer notified Naveed De and Андрей Tang they have 30 days from date of birth to add  to insurance policy. They verbalized understanding. Naveed De confirmed a safe place for infant to sleep at home. Infant name on BC: Camille Browning (middle name undecided). Infant PCP [facility]. DME: no. Does have BP cuff at home.    HOME CARE: no    Readmission Risk              Risk of Unplanned Readmission:  4

## 2023-08-09 NOTE — FLOWSHEET NOTE
Patient reports pain scale of  9/10. Patient states that she has used the nitrous with approximately 90% of her contractions. Patient reports nitrous is not helping with pain control.

## 2023-08-09 NOTE — ANESTHESIA POSTPROCEDURE EVALUATION
Department of Anesthesiology  Postprocedure Note    Patient: Esther Davis  MRN: [id number]  [facility] 2000  Date of evaluation: 8/9/2023      Procedure Summary     Date: 08/08/23 Room / Location:     Anesthesia Start: 2108 Anesthesia Stop: 08/09/23 0650    Procedure: Labor Analgesia Diagnosis:     Scheduled Providers:  Responsible Provider: Jose Duarte MD    Anesthesia Type: epidural ASA Status: 2          Anesthesia Type: No value filed.     Faith Phase I: Faith Score: 9    Faith Phase II:        Anesthesia Post Evaluation    Patient location during evaluation: floor  Patient participation: complete - patient participated  Level of consciousness: awake  Pain score: 0  Airway patency: patent  Nausea & Vomiting: no vomiting and no nausea  Complications: no  Cardiovascular status: blood pressure returned to baseline  Respiratory status: acceptable  Hydration status: stable  Pain management: adequate

## 2023-08-09 NOTE — LACTATION NOTE
INPATIENT CONSULT    Maternal /para status:G1    Maternal breastfeeding history:        Current pregnancy:    Gestational age: 40.3 weeks     C/section or vaginal delivery: vaginal      Birth weight: 7.3lb (3260g)      SGA/LGA/IUGR/diabetes during pregnancy: no      Plan for feeding: breast      Breast pump at home: Yes  Needs medical necessity form:      Assessment of breastfeeding: Baby has not nursed since delivery, LC assisted in recovery, uninterested. Place baby skin to skin and call out when baby arouses.            Reviewed:   - Breastfeeding packet  - Expectations for normal  feeding   - Hand expression  - Deep latch/milk transfer  - Hunger cues    Encouraged:   - Frequent skin to skin   - Frequent attempts to feed  - Calling for assistance as needed

## 2023-08-09 NOTE — FLOWSHEET NOTE
Self-administered nitrous oxide discontinued at 2128  due to epidural . Pt assessment, VS, and FHT's as charted.

## 2023-08-09 NOTE — ANESTHESIA PROCEDURE NOTES
Epidural Block    Patient location during procedure: OB  Reason for block: labor epidural  Staffing  Performed: resident/CRNA   Resident/CRNA: PILAR Pitts CRNA  Epidural  Patient position: sitting  Prep: Betadine  Patient monitoring: continuous pulse ox and frequent blood pressure checks  Approach: midline  Location: L3-4  Injection technique: KARAN saline  Provider prep: mask and sterile gloves  Needle  Needle type: Tuohy   Needle gauge: 17 G  Needle length: 3.5 in  Needle insertion depth: 5 cm  Catheter type: multi-orifice  Catheter size: 19 G  Catheter at skin depth: 11 cm  Test dose: negativeCatheter Secured: tegaderm and tape  Assessment  Hemodynamics: stable  Attempts: 2  Outcomes: uncomplicated and patient tolerated procedure well  Preanesthetic Checklist  Completed: patient identified, IV checked, site marked, risks and benefits discussed, surgical/procedural consents, equipment checked, pre-op evaluation, timeout performed, anesthesia consent given, oxygen available and monitors applied/VS acknowledged

## 2023-08-09 NOTE — FLOWSHEET NOTE
Patient sitting on toilet stating she feels light headed as cresencio care is being done. Patient proceeds to faint. Writer and spouse hold patient safely up on toilet. Patient alert and oriented after 20 seconds. Patient back to bed with sarastedy and help from other nurses. Vitals obtained as charted. Dr. Tomás Carty notified and orders received.

## 2023-08-09 NOTE — FLOWSHEET NOTE
Risks and benefits of vacuum assist explained per Dr. Topher Miranda. Patient agreed to vacuum. NICU called to attend delivery.

## 2023-08-10 VITALS
BODY MASS INDEX: 25.27 KG/M2 | RESPIRATION RATE: 16 BRPM | HEART RATE: 89 BPM | SYSTOLIC BLOOD PRESSURE: 110 MMHG | HEIGHT: 64 IN | DIASTOLIC BLOOD PRESSURE: 72 MMHG | TEMPERATURE: 98.2 F | OXYGEN SATURATION: 97 % | WEIGHT: 148 LBS

## 2023-08-10 PROBLEM — O26.843 UTERINE SIZE-DATE DISCREPANCY IN THIRD TRIMESTER: Status: RESOLVED | Noted: 2023-07-12 | Resolved: 2023-08-10

## 2023-08-10 PROBLEM — Z34.90 NORMAL INTRAUTERINE PREGNANCY, ANTEPARTUM: Status: RESOLVED | Noted: 2023-01-23 | Resolved: 2023-08-10

## 2023-08-10 PROBLEM — N30.01 ACUTE CYSTITIS WITH HEMATURIA: Status: RESOLVED | Noted: 2018-02-14 | Resolved: 2023-08-10

## 2023-08-10 PROBLEM — Z23 NEED FOR TDAP VACCINATION: Status: RESOLVED | Noted: 2023-05-15 | Resolved: 2023-08-10

## 2023-08-10 PROBLEM — Z3A.40 40 WEEKS GESTATION OF PREGNANCY: Status: RESOLVED | Noted: 2023-08-08 | Resolved: 2023-08-10

## 2023-08-10 PROCEDURE — 6370000000 HC RX 637 (ALT 250 FOR IP)

## 2023-08-10 RX ADMIN — IBUPROFEN 600 MG: 600 TABLET ORAL at 09:52

## 2023-08-10 RX ADMIN — DOCUSATE SODIUM 100 MG: 100 CAPSULE, LIQUID FILLED ORAL at 09:52

## 2023-08-10 ASSESSMENT — PAIN SCALES - GENERAL: PAINLEVEL_OUTOF10: 3

## 2023-08-10 NOTE — LACTATION NOTE
Mom reports formula given to baby during the night because he would not latch and feed at breast, pumping equipment also noted at bedside for supplementing. Baby dressed, wrapped and in crib, encouraged to undress baby and place STS. Early cues reviewed, encouraged to call for assist when baby cues to feed.

## 2023-08-10 NOTE — LACTATION NOTE
Assisted with positioning baby in cross cradle hold, stressing need to bring baby up to and turned in to breast.  Baby latched for two short bursts of sucking before falling asleep. Encouraged to keep STS and call when baby cues to feed. Discharge instructions and LC follow up reviewed.

## 2023-08-10 NOTE — PLAN OF CARE
Problem: Postpartum  Goal: Experiences normal postpartum course   6407 by Purvi Grey RN  Outcome: Completed  2023 by Wilbert Escalante RN  Outcome: Progressing  Goal: Appropriate maternal -  bonding  2004 by Purvi Grey RN  Outcome: Completed  2023 by Wilbert Escalante RN  Outcome: Progressing  Goal: Establishment of infant feeding pattern  2720 by Purvi Grey RN  Outcome: Completed  2023 by Wilbert Escalante RN  Outcome: Progressing  Goal: Incisions, wounds, or drain sites healing without S/S of infection   008 by Purvi Grey RN  Outcome: Completed  2023 by Wilbert Escalante RN  Outcome: Progressing     Problem: Pain  Goal: Verbalizes/displays adequate comfort level or baseline comfort level  [phone] by Purvi Grey RN  Outcome: Completed  2023 by Wilbert Escalante RN  Outcome: Progressing     Problem: Infection - Adult  Goal: Absence of infection at discharge  [phone] by Purvi Grey RN  Outcome: Completed  2023 by Wilbert Escalante RN  Outcome: Progressing  Goal: Absence of infection during hospitalization   3524 by Purvi Grey RN  Outcome: Completed  2023 by Wilbert Escalante RN  Outcome: Progressing  Goal: Absence of fever/infection during anticipated neutropenic period  [phone] by Purvi Grey RN  Outcome: Completed  2023 by Wilbert Escalante RN  Outcome: Progressing     Problem: Safety - Adult  Goal: Free from fall injury   5685 by Purvi Grey RN  Outcome: Completed  2023 by Wilbert Escalante RN  Outcome: Progressing     Problem: Discharge Planning  Goal: Discharge to home or other facility with appropriate resources   1193 by Purvi Grey RN  Outcome: Completed  2023 by Wilbert Escalante RN  Outcome: Progressing     Problem: Chronic Conditions and Co-morbidities  Goal: Patient's

## 2023-08-10 NOTE — PLAN OF CARE
Problem: Vaginal Birth or  Section  Goal: Fetal and maternal status remain reassuring during the birth process  Description:  Birth OB-Pregnancy care plan goal which identifies if the fetal and maternal status remain reassuring during the birth process  [id number] by Spenser Frankel RN  Outcome: Completed     Problem: Postpartum  Goal: Experiences normal postpartum course  Description:  Postpartum OB-Pregnancy care plan goal which identifies if the mother is experiencing a normal postpartum course  Outcome: Progressing  Goal: Appropriate maternal -  bonding  Description:  Postpartum OB-Pregnancy care plan goal which identifies if the mother and  are bonding appropriately  Outcome: Progressing  Goal: Establishment of infant feeding pattern  Description:  Postpartum OB-Pregnancy care plan goal which identifies if the mother is establishing a feeding pattern with their   Outcome: Progressing  Goal: Incisions, wounds, or drain sites healing without S/S of infection  Outcome: Progressing     Problem: Pain  Goal: Verbalizes/displays adequate comfort level or baseline comfort level  Outcome: Progressing     Problem: Infection - Adult  Goal: Absence of infection at discharge  Outcome: Progressing  Goal: Absence of infection during hospitalization  Outcome: Progressing  Goal: Absence of fever/infection during anticipated neutropenic period  Outcome: Progressing     Problem: Safety - Adult  Goal: Free from fall injury  Outcome: Progressing     Problem: Discharge Planning  Goal: Discharge to home or other facility with appropriate resources  Outcome: Progressing     Problem: Chronic Conditions and Co-morbidities  Goal: Patient's chronic conditions and co-morbidity symptoms are monitored and maintained or improved  Outcome: Progressing

## 2023-08-11 ENCOUNTER — CARE COORDINATION (OUTPATIENT)
Dept: OTHER | Facility: CLINIC | Age: 23
End: 2023-08-11

## 2023-08-11 NOTE — CARE COORDINATION
ACM attempted to reach patient for [facility] transitions call. HIPAA compliant message left requesting a return phone call at patients convenience. Will continue to follow.

## 2023-08-23 ENCOUNTER — POSTPARTUM VISIT (OUTPATIENT)
Dept: OBGYN CLINIC | Age: 23
End: 2023-08-23

## 2023-08-23 VITALS
HEIGHT: 64 IN | DIASTOLIC BLOOD PRESSURE: 60 MMHG | SYSTOLIC BLOOD PRESSURE: 106 MMHG | BODY MASS INDEX: 22.45 KG/M2 | WEIGHT: 131.5 LBS

## 2023-08-23 PROCEDURE — 99024 POSTOP FOLLOW-UP VISIT: CPT

## 2023-08-23 NOTE — PROGRESS NOTES
HPI:  DELIVERY DATE: 08/09/2023  TYPE OF DELIVERY: vacuum-assisted vaginal delivery  PROVIDER: Graciela Heredia CNM  PERINEUM: 2nd degree repair     Amanda Hines was seen for her 2 week visit. Her Male infant is healthy. She is pumping breastmilk feeding with bottles. She is breastfeeding without difficulty. She is not experiencing pain. She is rating her pain 0  She reports her lochia is thin lochia  She reports no perineal discomfort. She denies urinary incontinence. Her bowels have returned to her normal pattern. She is back to her normal activity pattern. Amanda Hines has not engaged in intercourse. She does not report a mood disorder. She feels she is having difficulty coping. Amanda Hines feels her family adjustment is effective. She reports an overall positive birth experience. OBJECTIVE:   Wt Readings from Last 3 Encounters:   08/23/23 131 lb 8 oz (59.6 kg)   08/08/23 148 lb (67.1 kg)   08/08/23 148 lb (67.1 kg)       Blood pressure 106/60, height 5' 4\" (1.626 m), weight 131 lb 8 oz (59.6 kg), last menstrual period 10/30/2022, currently breastfeeding.     EPDS:   In the past 7 days:  I have been able to laugh and see the funny side of things: As much as I always could  I have looked forward with enjoyment to things: As much as I ever did  I have blamed myself unnecessarily when things went wrong: Not very often  I have been anxious or worried for no good reason: No, not at all  I have felt scared or panicky for no good reason: No, not at all  I haven't been able to cope lately: No, I have been coping as well as ever  I have been so unhappy that I have had difficulty sleeping: Not at all  I have felt sad or miserable: No, not at all  I have been so unhappy that I have been crying: No, never  The thought of harming myself has occurred to me: Never  Total: 1    Social Determinants of Health:   Financial Resource Strain: Low Risk     Difficulty of Paying Living Expenses: Not hard at all      Food Insecurity: No

## 2023-08-31 ENCOUNTER — CARE COORDINATION (OUTPATIENT)
Dept: OTHER | Facility: CLINIC | Age: 23
End: 2023-08-31

## 2023-09-15 ENCOUNTER — CARE COORDINATION (OUTPATIENT)
Dept: OTHER | Facility: CLINIC | Age: 23
End: 2023-09-15

## 2023-09-15 NOTE — CARE COORDINATION
Patient Current Location: Home: [address] [place] [address],[address]  [address] [address]    Last Discharge [address] [facility]       Date Complaint Diagnosis Description Type Department Provider    23 Scheduled Induction  Admission (Discharged) ELIU Fletcherchilogabriela Guidry, [facility] contacted the patient by telephone to discuss the maternity management program.  Patient agrees to care management services at this time. Verified name and  with patient as identifiers. Risk Factors Identified:  None      Needs to be reviewed by the provider   none         Method of communication with provider : none    Advance Care Planning:   Does patient have an Advance Directive:  reviewed and current. Does patient have OB/Gyn Selected? Yes    Discussed follow up appointments. If no appointment was previously scheduled, appointment scheduling offered: Yes  [facility] follow up appointment(s):   Future Appointments   Date Time Provider [address]   2023 10:50 AM PILAR Garcia CNM [facility]     [facility] [facility] follow up appointment(s): n/a    OB History:   OB History    Para Term  AB Living   1 1 1 0 0 1   SAB IAB Ectopic Molar Multiple Live Births   0 0 0 0 0 1      # Outcome Date GA Lbr Rashad/2nd Weight Sex Delivery Anes PTL Lv   1 Term 23 40w3d 02:26 / 03:14 7 lb 3 oz (3.26 kg) M Vag-Vacuum EPI N BRENNON       40w3d    Medication reconciliation was performed with patient, who verbalizes understanding of administration of home medications. Advised obtaining a 90-day supply of all daily and as-needed medications. Barriers/Support system:  patient and spouse  Return to work planning? At 14 weeks PP       Postpartum Assessment:  Vaginal  Lochia-resolved  Fever No  BM?  Yes   Decreased urinary output No  Perineal care-pads as needed  Visual changes No  Calf pain No  Headache No  Swelling No  Breast Pain No  Depression or feelings of sadness or overwhelm-None  Breast Feeding

## 2023-09-18 ENCOUNTER — TELEPHONE (OUTPATIENT)
Dept: OBGYN CLINIC | Age: 23
End: 2023-09-18

## 2023-09-18 NOTE — TELEPHONE ENCOUNTER
Patient called states that she has breast pain and hard spot on her breast and wants to know what she can do to prevent mastitis . Per johnie patient can use ice, gentle massage towards armpit, motrin for pain. If doesn't improve or develops a fever patient needs to call and schedule appointment. Patient informed and verbalizes understanding.

## 2023-09-20 ENCOUNTER — HOSPITAL ENCOUNTER (OUTPATIENT)
Age: 23
Setting detail: SPECIMEN
Discharge: HOME | End: 2023-09-20

## 2023-09-20 ENCOUNTER — POSTPARTUM VISIT (OUTPATIENT)
Dept: OBGYN CLINIC | Age: 23
End: 2023-09-20

## 2023-09-20 VITALS
DIASTOLIC BLOOD PRESSURE: 60 MMHG | HEIGHT: 64 IN | WEIGHT: 130 LBS | SYSTOLIC BLOOD PRESSURE: 106 MMHG | BODY MASS INDEX: 22.2 KG/M2

## 2023-09-20 DIAGNOSIS — Z31.69 PROCREATIVE MANAGEMENT COUNSELING: ICD-10-CM

## 2023-09-20 LAB
ERYTHROCYTE [DISTWIDTH] IN BLOOD BY AUTOMATED COUNT: 12 % (ref 11.8–14.4)
HCT VFR BLD AUTO: 43.7 % (ref 36.3–47.1)
HGB BLD-MCNC: 13.6 G/DL (ref 11.9–15.1)
MCH RBC QN AUTO: 33.3 PG (ref 25.2–33.5)
MCHC RBC AUTO-ENTMCNC: 31.1 G/DL (ref 28.4–34.8)
MCV RBC AUTO: 107.1 FL (ref 82.6–102.9)
NRBC BLD-RTO: 0 PER 100 WBC
PLATELET # BLD AUTO: 225 K/UL (ref 138–453)
PMV BLD AUTO: 10.6 FL (ref 8.1–13.5)
RBC # BLD AUTO: 4.08 M/UL (ref 3.95–5.11)
WBC OTHER # BLD: 6.8 K/UL (ref 3.5–11.3)

## 2023-09-20 PROCEDURE — 0503F POSTPARTUM CARE VISIT: CPT | Performed by: ADVANCED PRACTICE MIDWIFE

## 2023-09-20 NOTE — PROGRESS NOTES
Patient Name: Staci Harvey  Patient : 2000  MRN #: [id number]  [facility] #: [id number]  [address] [place] [address] AND GYNECOLOGY  [address] [place] [address].  Radha Rivas [address]  Dept: [phone]   Date: 2023  Time: 11:18 AM      Chief Complaint   Patient presents with    Postpartum Care       Subjective  HPI:  DELIVERY DATE: 23  TYPE OF DELIVERY: vacuum assisted vaginal delivery  PROVIDER: Snow Pastrana CNM/ Dr. Barbie Meigs: 2nd degree    Walter Diallo was seen for her 6 week visit. Her Male infant is healthy. She is breast feeding via pumping. She is breastfeeding without difficulty. She is not experiencing pain. She reports her lochia is no bleeding  She reports none perineal discomfort. She denies urinary incontinence. Her bowels have returned to her normal pattern. She is back to her normal activity pattern. She has not her first menses. She does have good support at home. She has been sleeping well. Walter Diallo has not engaged in intercourse. She does report a mood disorder. She feels she is not having difficulty coping. Walter Diallo feels her family adjustment is effective. She reports an overall positive birth experience. Her [place] Score is 0. This score does match my assessment. 2022     8:12 AM   PHQ Scores   PHQ2 Score 0   PHQ9 Score 0     Interpretation of Total Score Depression Severity: 1-4 = Minimal depression, 5-9 = Mild depression, 10-14 = Moderate depression, 15-19 = Moderately severe depression, 20-27 = Severe depression      2022     8:00 AM   URSULA 7 SCORE   URSULA-7 Total Score 1     Interpretation of URSULA-7 score: 5-9 = mild anxiety, 10-14 = moderate anxiety, 15+ = severe anxiety. Recommend referral to behavioral health for scores 10 or greater.        Postpartum Depression: Low Risk  (2023)    [place]  Depression Scale     Last EPDS Total Score: 1     Last EPDS

## 2024-04-08 ENCOUNTER — CARE COORDINATION (OUTPATIENT)
Dept: OTHER | Facility: CLINIC | Age: 24
End: 2024-04-08

## 2024-04-25 LAB
CHOLEST SERPL-MCNC: 148 MG/DL (ref 0–199)
CHOLESTEROL/HDL RATIO: 3
GLUCOSE SERPL-MCNC: 111 MG/DL (ref 74–99)
HDLC SERPL-MCNC: 59 MG/DL
LDLC SERPL CALC-MCNC: 75 MG/DL (ref 0–100)
PATIENT FASTING?: NO
TRIGL SERPL-MCNC: 72 MG/DL
VLDLC SERPL CALC-MCNC: 14 MG/DL

## 2024-06-14 NOTE — PROGRESS NOTES
Patient is here for missed period, lmp 4/20/24. Positive preg at home .     Patient reports no concerns today.

## 2024-06-16 SDOH — ECONOMIC STABILITY: INCOME INSECURITY: HOW HARD IS IT FOR YOU TO PAY FOR THE VERY BASICS LIKE FOOD, HOUSING, MEDICAL CARE, AND HEATING?: NOT HARD AT ALL

## 2024-06-16 SDOH — ECONOMIC STABILITY: FOOD INSECURITY: WITHIN THE PAST 12 MONTHS, YOU WORRIED THAT YOUR FOOD WOULD RUN OUT BEFORE YOU GOT MONEY TO BUY MORE.: NEVER TRUE

## 2024-06-16 SDOH — ECONOMIC STABILITY: FOOD INSECURITY: WITHIN THE PAST 12 MONTHS, THE FOOD YOU BOUGHT JUST DIDN'T LAST AND YOU DIDN'T HAVE MONEY TO GET MORE.: NEVER TRUE

## 2024-06-16 ASSESSMENT — PATIENT HEALTH QUESTIONNAIRE - PHQ9
SUM OF ALL RESPONSES TO PHQ QUESTIONS 1-9: 0
1. LITTLE INTEREST OR PLEASURE IN DOING THINGS: NOT AT ALL
1. LITTLE INTEREST OR PLEASURE IN DOING THINGS: NOT AT ALL
SUM OF ALL RESPONSES TO PHQ9 QUESTIONS 1 & 2: 0
SUM OF ALL RESPONSES TO PHQ QUESTIONS 1-9: 0
SUM OF ALL RESPONSES TO PHQ QUESTIONS 1-9: 0
SUM OF ALL RESPONSES TO PHQ9 QUESTIONS 1 & 2: 0
2. FEELING DOWN, DEPRESSED OR HOPELESS: NOT AT ALL
SUM OF ALL RESPONSES TO PHQ QUESTIONS 1-9: 0
2. FEELING DOWN, DEPRESSED OR HOPELESS: NOT AT ALL

## 2024-06-17 ENCOUNTER — OFFICE VISIT (OUTPATIENT)
Dept: OBGYN CLINIC | Age: 24
End: 2024-06-17
Payer: COMMERCIAL

## 2024-06-17 ENCOUNTER — PROCEDURE VISIT (OUTPATIENT)
Dept: OBGYN CLINIC | Age: 24
End: 2024-06-17
Payer: COMMERCIAL

## 2024-06-17 VITALS
HEART RATE: 86 BPM | WEIGHT: 121.5 LBS | HEIGHT: 63 IN | BODY MASS INDEX: 21.53 KG/M2 | SYSTOLIC BLOOD PRESSURE: 110 MMHG | DIASTOLIC BLOOD PRESSURE: 62 MMHG

## 2024-06-17 DIAGNOSIS — Z32.01 POSITIVE PREGNANCY TEST: ICD-10-CM

## 2024-06-17 DIAGNOSIS — N92.6 MISSED PERIOD: Primary | ICD-10-CM

## 2024-06-17 PROBLEM — Z34.90 NORMAL REPEAT PREGNANCY, ANTEPARTUM: Status: ACTIVE | Noted: 2024-06-17

## 2024-06-17 LAB
CONTROL: YES
PREGNANCY TEST URINE, POC: POSITIVE

## 2024-06-17 PROCEDURE — 99213 OFFICE O/P EST LOW 20 MIN: CPT | Performed by: ADVANCED PRACTICE MIDWIFE

## 2024-06-17 PROCEDURE — 76801 OB US < 14 WKS SINGLE FETUS: CPT | Performed by: OBSTETRICS & GYNECOLOGY

## 2024-06-17 PROCEDURE — 81025 URINE PREGNANCY TEST: CPT | Performed by: ADVANCED PRACTICE MIDWIFE

## 2024-06-17 RX ORDER — PNV NO.95/FERROUS FUM/FOLIC AC 28MG-0.8MG
1 TABLET ORAL DAILY
COMMUNITY

## 2024-06-17 NOTE — PROGRESS NOTES
LMP: 4/20/24    GA by LMP: 8w2d  EVITA by LMP: 1/25/25    GA by U/S: 8w2d SLIUP  EVITA by U/S: 1/25/25  HR: 159 bpm    RT. OVARY: Not visualized  LT. OVARY: 3.98 x 2.90 x 3.25 cm   Corpus luteum: 2.85 x 2.85 x 2.88 cm    Small amount of free fluid in cul-de-sac    Subchorionic hemorrhage seen left fundus, adjacent to gestational sac: 2.01 x 0.94 x 1.65 cm

## 2024-06-17 NOTE — PROGRESS NOTES
SUBJECTIVE:    Chief Complaint:  Chief Complaint   Patient presents with    Amenorrhea     HPI:  Malaika is a 24 y.o. female being seen today for missed menses.  She reports a positive pregnancy test right when she missed her period.  This is a planned pregnancy. She is  accepting at this time.  LMP: Patient's last menstrual period was 2024.  Sure and definite: Yes. Regular monthly cycle: Yes    She was not on a contraceptive at the time of conception.    Estimated weeks gestation today: 8w2d  Tentative EVITA: 25    Relationship with FOB: involved     OBJECTIVE:    /62   Pulse 86   Ht 1.6 m (5' 3\")   Wt 55.1 kg (121 lb 8 oz)   LMP 2024  Body mass index is 21.52 kg/m².     Mother's ethnicity:    Father's ethnicity:      She is complaining today of:   Pain: No  Cramping: No  Bleeding or spotting: No    Nausea: Yes  Vomiting: No    Breast enlargement/tenderness: Yes  Fatigue: Yes  Frequency of urination: No    Previous high risk obstetrical history: none  OB History    Para Term  AB Living   2 1 1 0 0 1   SAB IAB Ectopic Molar Multiple Live Births   0 0 0 0 0 1      # Outcome Date GA Lbr Rashad/2nd Weight Sex Delivery Anes PTL Lv   2 Current            1 Term 23 40w3d 02:26 / 03:14 3.26 kg (7 lb 3 oz) M Vag-Vacuum EPI N BRENNON       ROS was done and is negative except as documented in HPI.  History was reviewed as documented on Epic Navigator.    ASSESSMENT/PLAN:  Missed menses with + UCG  UCG was done and noted as positive  Prenatal vitamins were ordered: No taking   Reviewed recommended folic acid intake and importance for preventing neural tube defects.  Discussed self-help for nausea, avoiding OTC medications until consulting provider or pharmacist.  Ultrasound for dating and viability was ordered and instructed to complete before OB Hx visit: yes, done  Initial information on genetic testing was given: Yes  Discussed in detail referral/orders for for 1st

## 2024-07-01 NOTE — PROGRESS NOTES
New Obstetric Intake     Patient Name:Malaika Ca  Patient Age: 24 y.o.  Date of Visit: 7/3/2024  Patient's estimated gestational age at visit: 10w4d  Estimated Date of Delivery: 25    SUBJECTIVE:     Any Concerns Today: none- patient declined a genetic consult.    OB History          2    Para   1    Term   1       0    AB   0    Living   1         SAB   0    IAB   0    Ectopic   0    Molar   0    Multiple   0    Live Births   1          Obstetric Comments   [id number]-[id number]-Lko-Abiel               [place] Score:   Postpartum Depression: Low Risk  (7/3/2024)    [place]  Depression Scale     Last EPDS Total Score: 0     Last EPDS Self Harm Result: Never      History of postpartum depression: no    Generalized Anxiety Screenin/3/2024     2:00 PM 2022     8:00 AM   URSULA 7 SCORE   URSULA-7 Total Score 0 1     Interpretation of URSULA-7 score: 5-9 = mild anxiety, 10-14 = moderate anxiety, 15+ = severe anxiety. Recommend referral to behavioral health for scores 10 or greater.     Social Determinants of Health:   Financial Resource Strain: Low Risk  (7/3/2024)    Overall Financial Resource Strain (CARDIA)     Difficulty of Paying Living Expenses: Not hard at all      Food Insecurity: No Food Insecurity (7/3/2024)    Hunger Vital Sign     Worried About Running Out of Food in the Last Year: Never true     Ran Out of Food in the Last Year: Never true      Transportation Needs: No Transportation Needs (7/3/2024)    PRAPARE - Transportation     Lack of Transportation (Medical): No     Lack of Transportation (Non-Medical): No      Intimate Partner Violence: Not At Risk (7/3/2024)    Humiliation, Afraid, Rape, and Kick questionnaire     Fear of Current or Ex-Partner: No     Emotionally Abused: No     Physically Abused: No     Sexually Abused: No       OBJECTIVE:       /60   Pulse 82   Wt 56.5 kg (124 lb 9.6 oz)   LMP 2024      Medications:  Current Outpatient Medications

## 2024-07-03 ENCOUNTER — INITIAL PRENATAL (OUTPATIENT)
Dept: OBGYN CLINIC | Age: 24
End: 2024-07-03
Payer: COMMERCIAL

## 2024-07-03 ENCOUNTER — HOSPITAL ENCOUNTER (OUTPATIENT)
Age: 24
Setting detail: SPECIMEN
Discharge: HOME | End: 2024-07-03

## 2024-07-03 VITALS
SYSTOLIC BLOOD PRESSURE: 114 MMHG | BODY MASS INDEX: 22.07 KG/M2 | DIASTOLIC BLOOD PRESSURE: 60 MMHG | WEIGHT: 124.6 LBS | HEART RATE: 82 BPM

## 2024-07-03 DIAGNOSIS — Z34.90 NORMAL REPEAT PREGNANCY, ANTEPARTUM: ICD-10-CM

## 2024-07-03 DIAGNOSIS — Z34.90 NORMAL REPEAT PREGNANCY, ANTEPARTUM: Primary | ICD-10-CM

## 2024-07-03 LAB
ABO + RH BLD: NORMAL
BLOOD GROUP ANTIBODIES SERPL: NEGATIVE

## 2024-07-03 PROCEDURE — 0500F INITIAL PRENATAL CARE VISIT: CPT | Performed by: ADVANCED PRACTICE MIDWIFE

## 2024-07-03 PROCEDURE — 36415 COLL VENOUS BLD VENIPUNCTURE: CPT | Performed by: ADVANCED PRACTICE MIDWIFE

## 2024-07-03 SDOH — ECONOMIC STABILITY: INCOME INSECURITY: IN THE LAST 12 MONTHS, WAS THERE A TIME WHEN YOU WERE NOT ABLE TO PAY THE MORTGAGE OR RENT ON TIME?: NO

## 2024-07-03 SDOH — ECONOMIC STABILITY: FOOD INSECURITY: WITHIN THE PAST 12 MONTHS, THE FOOD YOU BOUGHT JUST DIDN'T LAST AND YOU DIDN'T HAVE MONEY TO GET MORE.: NEVER TRUE

## 2024-07-03 SDOH — ECONOMIC STABILITY: FOOD INSECURITY: WITHIN THE PAST 12 MONTHS, YOU WORRIED THAT YOUR FOOD WOULD RUN OUT BEFORE YOU GOT MONEY TO BUY MORE.: NEVER TRUE

## 2024-07-03 SDOH — ECONOMIC STABILITY: HOUSING INSECURITY: IN THE LAST 12 MONTHS, HOW MANY PLACES HAVE YOU LIVED?: 1

## 2024-07-03 ASSESSMENT — ANXIETY QUESTIONNAIRES
4. TROUBLE RELAXING: NOT AT ALL
1. FEELING NERVOUS, ANXIOUS, OR ON EDGE: NOT AT ALL
3. WORRYING TOO MUCH ABOUT DIFFERENT THINGS: NOT AT ALL
6. BECOMING EASILY ANNOYED OR IRRITABLE: NOT AT ALL
5. BEING SO RESTLESS THAT IT IS HARD TO SIT STILL: NOT AT ALL
GAD7 TOTAL SCORE: 0
7. FEELING AFRAID AS IF SOMETHING AWFUL MIGHT HAPPEN: NOT AT ALL
2. NOT BEING ABLE TO STOP OR CONTROL WORRYING: NOT AT ALL

## 2024-07-03 ASSESSMENT — SOCIAL DETERMINANTS OF HEALTH (SDOH)
WITHIN THE LAST YEAR, HAVE TO BEEN RAPED OR FORCED TO HAVE ANY KIND OF SEXUAL ACTIVITY BY YOUR PARTNER OR EX-PARTNER?: NO
WITHIN THE LAST YEAR, HAVE YOU BEEN AFRAID OF YOUR PARTNER OR EX-PARTNER?: NO
WITHIN THE LAST YEAR, HAVE YOU BEEN KICKED, HIT, SLAPPED, OR OTHERWISE PHYSICALLY HURT BY YOUR PARTNER OR EX-PARTNER?: NO
WITHIN THE LAST YEAR, HAVE YOU BEEN HUMILIATED OR EMOTIONALLY ABUSED IN OTHER WAYS BY YOUR PARTNER OR EX-PARTNER?: NO
HOW HARD IS IT FOR YOU TO PAY FOR THE VERY BASICS LIKE FOOD, HOUSING, MEDICAL CARE, AND HEATING?: NOT HARD AT ALL

## 2024-07-03 NOTE — PROGRESS NOTES
New Obstetric Intake     Patient Name:Malaika Ca  Patient Age: 24 y.o.  Date of Visit: 7/3/2024  Patient's estimated gestational age at visit: 10w4d  Estimated Date of Delivery: 25    SUBJECTIVE:     Any Concerns Today: none    OB History          2    Para   1    Term   1       0    AB   0    Living   1         SAB   0    IAB   0    Ectopic   0    Molar   0    Multiple   0    Live Births   1          Obstetric Comments   A8-B4-Mhu-Abiel               [place] Score:   Postpartum Depression: Low Risk  (7/3/2024)    [place]  Depression Scale     Last EPDS Total Score: 0     Last EPDS Self Harm Result: Never      History of postpartum depression: no    Generalized Anxiety Screenin/3/2024     2:00 PM 2022     8:00 AM   URSULA 7 SCORE   URSULA-7 Total Score 0 1     Interpretation of URSULA-7 score: 5-9 = mild anxiety, 10-14 = moderate anxiety, 15+ = severe anxiety. Recommend referral to behavioral health for scores 10 or greater.     Social Determinants of Health:   Financial Resource Strain: Low Risk  (7/3/2024)    Overall Financial Resource Strain (CARDIA)     Difficulty of Paying Living Expenses: Not hard at all      Food Insecurity: No Food Insecurity (7/3/2024)    Hunger Vital Sign     Worried About Running Out of Food in the Last Year: Never true     Ran Out of Food in the Last Year: Never true      Transportation Needs: No Transportation Needs (7/3/2024)    PRAPARE - Transportation     Lack of Transportation (Medical): No     Lack of Transportation (Non-Medical): No      Intimate Partner Violence: Not At Risk (7/3/2024)    Humiliation, Afraid, Rape, and Kick questionnaire     Fear of Current or Ex-Partner: No     Emotionally Abused: No     Physically Abused: No     Sexually Abused: No       OBJECTIVE:       /60   Pulse 82   Wt 56.5 kg (124 lb 9.6 oz)   LMP 2024      Medications:  Current Outpatient Medications   Medication Sig Dispense Refill    Prenatal

## 2024-07-03 NOTE — PROGRESS NOTES
New Obstetric Intake     Patient Name:Malaika Ca  Patient Age: 24 y.o.  Date of Visit: 7/3/2024  Patient's estimated gestational age at visit: 10w4d  Estimated Date of Delivery: 25    SUBJECTIVE:     Any Concerns Today: none    OB History          2    Para   1    Term   1       0    AB   0    Living   1         SAB   0    IAB   0    Ectopic   0    Molar   0    Multiple   0    Live Births   1          Obstetric Comments   A4-J1-Nja-Abiel               [place] Score:   Postpartum Depression: Low Risk  (7/3/2024)    [place]  Depression Scale     Last EPDS Total Score: 0     Last EPDS Self Harm Result: Never      History of postpartum depression: no    Generalized Anxiety Screenin/3/2024     2:00 PM 2022     8:00 AM   URSULA 7 SCORE   URSULA-7 Total Score 0 1     Interpretation of URSULA-7 score: 5-9 = mild anxiety, 10-14 = moderate anxiety, 15+ = severe anxiety. Recommend referral to behavioral health for scores 10 or greater.     Social Determinants of Health:   Financial Resource Strain: Low Risk  (7/3/2024)    Overall Financial Resource Strain (CARDIA)     Difficulty of Paying Living Expenses: Not hard at all      Food Insecurity: No Food Insecurity (7/3/2024)    Hunger Vital Sign     Worried About Running Out of Food in the Last Year: Never true     Ran Out of Food in the Last Year: Never true      Transportation Needs: No Transportation Needs (7/3/2024)    PRAPARE - Transportation     Lack of Transportation (Medical): No     Lack of Transportation (Non-Medical): No      Intimate Partner Violence: Not At Risk (7/3/2024)    Humiliation, Afraid, Rape, and Kick questionnaire     Fear of Current or Ex-Partner: No     Emotionally Abused: No     Physically Abused: No     Sexually Abused: No       OBJECTIVE:       /60   Pulse 82   Wt 56.5 kg (124 lb 9.6 oz)   LMP 2024      Medications:  Current Outpatient Medications   Medication Sig Dispense Refill    Prenatal

## 2024-07-04 LAB
25(OH)D3 SERPL-MCNC: 29.5 NG/ML (ref 30–100)
AMPHET UR QL SCN: NEGATIVE
BARBITURATES UR QL SCN: NEGATIVE
BASOPHILS # BLD: 0.04 K/UL (ref 0–0.2)
BASOPHILS NFR BLD: 0 % (ref 0–2)
BENZODIAZ UR QL: NEGATIVE
CANNABINOIDS UR QL SCN: NEGATIVE
COCAINE UR QL SCN: NEGATIVE
EOSINOPHIL # BLD: <0.03 K/UL (ref 0–0.44)
EOSINOPHILS RELATIVE PERCENT: 0 % (ref 1–4)
ERYTHROCYTE [DISTWIDTH] IN BLOOD BY AUTOMATED COUNT: 13.2 % (ref 11.8–14.4)
FENTANYL UR QL: NEGATIVE
HBV SURFACE AG SERPL QL IA: NONREACTIVE
HCT VFR BLD AUTO: 38.9 % (ref 36.3–47.1)
HCV AB SERPL QL IA: NONREACTIVE
HGB BLD-MCNC: 13 G/DL (ref 11.9–15.1)
HIV 1+2 AB+HIV1 P24 AG SERPL QL IA: NONREACTIVE
IMM GRANULOCYTES # BLD AUTO: <0.03 K/UL (ref 0–0.3)
IMM GRANULOCYTES NFR BLD: 0 %
LYMPHOCYTES NFR BLD: 1.63 K/UL (ref 1.1–3.7)
LYMPHOCYTES RELATIVE PERCENT: 15 % (ref 24–43)
MCH RBC QN AUTO: 32.8 PG (ref 25.2–33.5)
MCHC RBC AUTO-ENTMCNC: 33.4 G/DL (ref 28.4–34.8)
MCV RBC AUTO: 98.2 FL (ref 82.6–102.9)
METHADONE UR QL: NEGATIVE
MICROORGANISM SPEC CULT: NORMAL
MONOCYTES NFR BLD: 0.49 K/UL (ref 0.1–1.2)
MONOCYTES NFR BLD: 5 % (ref 3–12)
NEUTROPHILS NFR BLD: 80 % (ref 36–65)
NEUTS SEG NFR BLD: 8.75 K/UL (ref 1.5–8.1)
NRBC BLD-RTO: 0 PER 100 WBC
OPIATES UR QL SCN: NEGATIVE
OXYCODONE UR QL SCN: NEGATIVE
PCP UR QL SCN: NEGATIVE
PLATELET # BLD AUTO: 207 K/UL (ref 138–453)
PMV BLD AUTO: 10.4 FL (ref 8.1–13.5)
RBC # BLD AUTO: 3.96 M/UL (ref 3.95–5.11)
RUBV IGG SERPL QL IA: 248 IU/ML
SERVICE CMNT-IMP: NORMAL
SPECIMEN DESCRIPTION: NORMAL
T PALLIDUM AB SER QL IA: NONREACTIVE
TEST INFORMATION: NORMAL
WBC OTHER # BLD: 11 K/UL (ref 3.5–11.3)

## 2024-07-05 DIAGNOSIS — R79.89 LOW VITAMIN D LEVEL: Primary | ICD-10-CM

## 2024-07-05 LAB
CHLAMYDIA DNA UR QL NAA+PROBE: NEGATIVE
HGB ELECTROPHORESIS INTERP: NORMAL
N GONORRHOEA DNA UR QL NAA+PROBE: NEGATIVE
PATHOLOGIST: NORMAL
SPECIMEN DESCRIPTION: NORMAL
VZV IGG SER QL IA: 1.77

## 2024-07-05 NOTE — PROGRESS NOTES
Encounter documentation reviewed by PILAR Gil CNM 7/5/2024 11:48 AM. OB physical to be performed at next prenatal visit.

## 2024-07-26 NOTE — PROGRESS NOTES
Pt is here today at her 14 wk appt   Pt states fetal movement is ***  Pt has ***      Last PAP not noted in chart   MFM anatomy scan not done

## 2024-07-29 ENCOUNTER — ROUTINE PRENATAL (OUTPATIENT)
Dept: OBGYN CLINIC | Age: 24
End: 2024-07-29

## 2024-07-29 VITALS
DIASTOLIC BLOOD PRESSURE: 60 MMHG | BODY MASS INDEX: 22.41 KG/M2 | SYSTOLIC BLOOD PRESSURE: 104 MMHG | HEART RATE: 71 BPM | WEIGHT: 126.5 LBS

## 2024-07-29 DIAGNOSIS — Z34.90 NORMAL REPEAT PREGNANCY, ANTEPARTUM: Primary | ICD-10-CM

## 2024-07-29 PROCEDURE — 0502F SUBSEQUENT PRENATAL CARE: CPT

## 2024-07-29 NOTE — PROGRESS NOTES
Pt is here today at her 14.2 pnv ob physical   Pt states fetal movement is n/a   Pt has no concerns   Patient stated she her lab results for her last pap faxed over. Unable to find pap results .

## 2024-07-29 NOTE — PROGRESS NOTES
SUBJECTIVE:  Malaika is here for her return OB visit & Physical Exam.   She denies feeling fetal movement.  She denies vaginal bleeding.  She denies vaginal discharge.  She denies leaking of fluid.  She denies uterine cramping.  She denies nausea and/or vomiting.  Malaika reports she is doing well. No concerns today.    OBJECTIVE:  Blood pressure 104/60, pulse 71, weight 57.4 kg (126 lb 8 oz), last menstrual period 2024, currently breastfeeding.    Pregravid BMI: 21.26   TW.948 kg (6 lb 8 oz)    See OB Physical in Staff Ranker Navigator   Pap: last year per patient, will need records release done again    A positive    ASSESSMENT/PLAN:  IUP at 14w2d  Malaika will watch for fetal movement.  NT screening was previously declined  NIPT was previously declined  Carrier screening was previously declined  [] EDC was established.  [x] Labs were reviewed.  [] Single sMAFP was not ordered for the 16-20 wk gestational window.   [] Weight gain guidelines were not reviewed.   Normal: BMI < 25: Gain 25-35 lb  Overweight: BMI 25-30: Gain 15-25 lb  Obese: BMI > 30: Gain 11-20 lb  [x] Reviewed warning signs: cramping, acute abdominal pain, dysuria, fever/chills, abnormal vaginal discharge or leaking fluid, or vaginal bleeding.  [x] MFM referral in place for anatomy scan at 18-22 weeks.   [x] I have reviewed LPN Initial OB Visit.     S = D    Malaika was seen today for routine prenatal visit.    Diagnoses and all orders for this visit:    Normal repeat pregnancy, antepartum  Anatomy scan on       Return in about 4 weeks (around 2024) for OB visit with midwives.    The patient, Malaika Ca, was seen with a total time spent of 30 minutes for the visit on this date of service by the [facility]  The time component involved both face-to-face (counseling and education) and non face-to-face time (care coordination), spent in determining the total time component.      On this date 2024,  I have spent greater than 50% of this

## 2024-08-15 ENCOUNTER — TELEPHONE (OUTPATIENT)
Dept: OBGYN CLINIC | Age: 24
End: 2024-08-15

## 2024-08-15 ENCOUNTER — HOSPITAL ENCOUNTER (EMERGENCY)
Age: 24
Discharge: HOME | End: 2024-08-15
Attending: EMERGENCY MEDICINE
Payer: COMMERCIAL

## 2024-08-15 VITALS
SYSTOLIC BLOOD PRESSURE: 100 MMHG | DIASTOLIC BLOOD PRESSURE: 65 MMHG | RESPIRATION RATE: 12 BRPM | OXYGEN SATURATION: 100 % | HEART RATE: 68 BPM | BODY MASS INDEX: 22.14 KG/M2 | WEIGHT: 125 LBS | TEMPERATURE: 97.6 F

## 2024-08-15 DIAGNOSIS — R42 DIZZINESS: Primary | ICD-10-CM

## 2024-08-15 LAB
ANION GAP SERPL CALCULATED.3IONS-SCNC: 9 MMOL/L (ref 9–16)
BASOPHILS # BLD: <0.03 K/UL (ref 0–0.2)
BASOPHILS NFR BLD: 0 % (ref 0–2)
BUN SERPL-MCNC: 10 MG/DL (ref 6–20)
CALCIUM SERPL-MCNC: 9.6 MG/DL (ref 8.6–10.4)
CHLORIDE SERPL-SCNC: 104 MMOL/L (ref 98–107)
CO2 SERPL-SCNC: 22 MMOL/L (ref 20–31)
CREAT SERPL-MCNC: 0.7 MG/DL (ref 0.5–0.9)
EOSINOPHIL # BLD: <0.03 K/UL (ref 0–0.44)
EOSINOPHILS RELATIVE PERCENT: 0 % (ref 1–4)
ERYTHROCYTE [DISTWIDTH] IN BLOOD BY AUTOMATED COUNT: 13.2 % (ref 11.8–14.4)
GFR, ESTIMATED: >90 ML/MIN/1.73M2
GLUCOSE SERPL-MCNC: 111 MG/DL (ref 74–99)
HCT VFR BLD AUTO: 38.3 % (ref 36.3–47.1)
HGB BLD-MCNC: 13.2 G/DL (ref 11.9–15.1)
IMM GRANULOCYTES # BLD AUTO: <0.03 K/UL (ref 0–0.3)
IMM GRANULOCYTES NFR BLD: 0 %
LYMPHOCYTES NFR BLD: 1.28 K/UL (ref 1.1–3.7)
LYMPHOCYTES RELATIVE PERCENT: 19 % (ref 24–43)
MCH RBC QN AUTO: 33.8 PG (ref 25.2–33.5)
MCHC RBC AUTO-ENTMCNC: 34.5 G/DL (ref 28.4–34.8)
MCV RBC AUTO: 98 FL (ref 82.6–102.9)
MONOCYTES NFR BLD: 0.35 K/UL (ref 0.1–1.2)
MONOCYTES NFR BLD: 5 % (ref 3–12)
NEUTROPHILS NFR BLD: 76 % (ref 36–65)
NEUTS SEG NFR BLD: 5 K/UL (ref 1.5–8.1)
NRBC BLD-RTO: 0 PER 100 WBC
PLATELET # BLD AUTO: 180 K/UL (ref 138–453)
PMV BLD AUTO: 10.2 FL (ref 8.1–13.5)
POTASSIUM SERPL-SCNC: 4.1 MMOL/L (ref 3.7–5.3)
RBC # BLD AUTO: 3.91 M/UL (ref 3.95–5.11)
SODIUM SERPL-SCNC: 135 MMOL/L (ref 136–145)
TROPONIN I SERPL HS-MCNC: <6 NG/L (ref 0–14)
WBC OTHER # BLD: 6.7 K/UL (ref 3.5–11.3)

## 2024-08-15 PROCEDURE — 84484 ASSAY OF TROPONIN QUANT: CPT

## 2024-08-15 PROCEDURE — 99284 EMERGENCY DEPT VISIT MOD MDM: CPT

## 2024-08-15 PROCEDURE — 93005 ELECTROCARDIOGRAM TRACING: CPT | Performed by: EMERGENCY MEDICINE

## 2024-08-15 PROCEDURE — 2580000003 HC RX 258: Performed by: EMERGENCY MEDICINE

## 2024-08-15 PROCEDURE — 80048 BASIC METABOLIC PNL TOTAL CA: CPT

## 2024-08-15 PROCEDURE — 85025 COMPLETE CBC W/AUTO DIFF WBC: CPT

## 2024-08-15 RX ORDER — 0.9 % SODIUM CHLORIDE 0.9 %
1000 INTRAVENOUS SOLUTION INTRAVENOUS ONCE
Status: COMPLETED | OUTPATIENT
Start: 2024-08-15 | End: 2024-08-15

## 2024-08-15 RX ADMIN — SODIUM CHLORIDE 1000 ML: 9 INJECTION, SOLUTION INTRAVENOUS at 09:12

## 2024-08-15 ASSESSMENT — ENCOUNTER SYMPTOMS: SHORTNESS OF BREATH: 0

## 2024-08-15 ASSESSMENT — PAIN - FUNCTIONAL ASSESSMENT: PAIN_FUNCTIONAL_ASSESSMENT: NONE - DENIES PAIN

## 2024-08-15 NOTE — ED NOTES
The following labs were labeled with appropriate pt sticker and tubed to lab:     [x] Blue     [x] Lavender   [] on ice  [x] Green/yellow  [] Green/black [] on ice  [] Godwin  [] on ice  [x] Yellow  [] Red  [] Pink  [] Type/ Screen  [] ABG  [] VBG    [] COVID-19 swab    [] Rapid  [] PCR  [] Flu swab  [] Peds Viral Panel     [] Urine Sample  [] Fecal Sample  [] Pelvic Cultures  [] Blood Cultures  [] X 2  [] STREP Cultures  [] Wound Cultures

## 2024-08-15 NOTE — ED PROVIDER NOTES
[facility] ED  Emergency Department Encounter  Emergency Medicine Resident     Pt Name:Malaika Ca  MRN: [id number]  Birthdate 2000  Date of evaluation: 8/15/24  PCP:  No primary care provider on file.  Note Started: 9:06 AM EDT      CHIEF COMPLAINT       Chief Complaint   Patient presents with    Dizziness       HISTORY OF PRESENT ILLNESS  (Location/Symptom, Timing/Onset, Context/Setting, Quality, Duration, Modifying Factors, Severity.)      Malaika Ca is a 24 y.o. female vasogenic syncope who presents with lightheadedness and dizziness.  Patient was at work today when she started feeling lightheaded like she was going to pass out.  She states she sat down and had improvement in her symptoms.  She never lost consciousness, did not fall.  She states she has had multiple episodes of this in the past and has been seen by cardiology and diagnosed with vasogenic syncope.  Patient is 17 weeks pregnant.  She denies any chest pain or shortness of breath, no palpitations.  She denies vaginal bleeding, discharge, leakage of fluids.  No abdominal pain or cramping.    PAST MEDICAL / SURGICAL / SOCIAL / FAMILY HISTORY      has a past medical history of Hypotension.       has a past surgical history that includes Foot surgery (Left).      Social History     Socioeconomic History    Marital status:      Spouse name: Not on file    Number of children: Not on file    Years of education: Not on file    Highest education level: Not on file   Occupational History    Not on file   Tobacco Use    Smoking status: Never    Smokeless tobacco: Never   Vaping Use    Vaping status: Never Used   Substance and Sexual Activity    Alcohol use: Never    Drug use: Never    Sexual activity: Not Currently     Partners: Male   Other Topics Concern    Not on file   Social History Narrative    Not on file     Social Determinants of Health     Financial Resource Strain: Low Risk  (7/3/2024)    Overall Financial Resource

## 2024-08-15 NOTE — ED NOTES
Patient to ED after near syncopal episode this morning. States she was working when she suddenly became diaphoretic and dizzy and had to lower self into chair. Coworker took vitals and patient's heart rate was 42 with a BP systolic in the 60s. Pt escorted to ED in wheelchair. Patient's vitals stable during triage. States approximately 17 weeks pregnant.

## 2024-08-15 NOTE — ED PROVIDER NOTES
[facility]  Emergency Department  Faculty Attestation     I performed a history and physical examination of the patient and discussed management with the resident. I reviewed the resident’s note and agree with the documented findings and plan of care. Any areas of disagreement are noted on the chart. I was personally present for the key portions of any procedures. I have documented in the chart those procedures where I was not present during the key portions. I have reviewed the emergency nurses triage note. I agree with the chief complaint, past medical history, past surgical history, allergies, medications, social and family history as documented unless otherwise noted below.    For Physician Assistant/ Nurse Practitioner cases/documentation I have personally evaluated this patient and have completed at least one if not all key elements of the E/M (history, physical exam, and MDM). Additional findings are as noted.    Preliminary note started at 8:30 AM EDT    Primary Care Physician:  No primary care provider on file.    Screenings:  [unfilled]    CHIEF COMPLAINT       Chief Complaint   Patient presents with    Dizziness       RECENT VITALS:   /74   Pulse 65   Temp 97.6 °F (36.4 °C)   Resp 12   Wt 56.7 kg (125 lb)   LMP 04/20/2024   SpO2 100%   BMI 22.14 kg/m²     LABS:  Labs Reviewed - No data to display    Radiology  No orders to display       CRITICAL CARE: There was a high probability of clinically significant/life threatening deterioration in this patient's condition which required my urgent intervention.  Total critical care time was none minutes.  This excludes any time for separately reportable procedures.     EKG:  EKG Interpretation    Interpreted by me    Rhythm: normal sinus   Rate: normal  Axis: normal  Ectopy: none  Conduction: normal  ST Segments: no acute change  T Waves: no acute change  Q Waves: none    Clinical Impression: no acute changes

## 2024-08-15 NOTE — TELEPHONE ENCOUNTER
Pt called to state she was at work last night and became very light headed and her bp was low. She states she is currently in [facility]'s ED and wanted to make office aware.

## 2024-08-15 NOTE — DISCHARGE INSTRUCTIONS
You were seen today for dizziness.  You received IV fluid.  Your lab work was normal.  We did a bedside ultrasound while you are here.  Please make sure that you are staying hydrated.    If you notice any concerning symptoms please return to the ER immediately. These can include but are not limited to: fevers, chills, shortness of breath, vomiting, weakness of the extremities, changes in your mental status, numbness, pale extremities, or chest pain.     Wound care: none    Diet: You may resume your normal diet     Activity: resume activity as tolerated.     Medications: Continue taking your home medications as previously directed.     Follow up: Please follow up with your primary care doctor within one week

## 2024-08-16 ENCOUNTER — CARE COORDINATION (OUTPATIENT)
Dept: OTHER | Facility: CLINIC | Age: 24
End: 2024-08-16

## 2024-08-16 NOTE — CARE COORDINATION
Ambulatory Care Coordination Note    ACM attempted to reach patient for introduction to Associate Care Management related to Maternity CM and ED follow-up. HIPAA compliant message left requesting a return phone call at patient convenience.     Plan for follow-up call in 7-10 days      Future Appointments   Date Time Provider Department Center   8/28/2024  8:20 AM Jasmyn Henley APRN - CNM Sprg Deb [facility]   9/9/2024  7:00 AM [facility]APHER [facility]       RUSLAN Jennings, RN  Associate Care Manager   Cell: [phone]  [email or web address]

## 2024-08-18 LAB
EKG ATRIAL RATE: 72 BPM
EKG P AXIS: 59 DEGREES
EKG P-R INTERVAL: 122 MS
EKG Q-T INTERVAL: 398 MS
EKG QRS DURATION: 84 MS
EKG QTC CALCULATION (BAZETT): 435 MS
EKG R AXIS: 81 DEGREES
EKG T AXIS: 74 DEGREES
EKG VENTRICULAR RATE: 72 BPM

## 2024-08-28 ENCOUNTER — ROUTINE PRENATAL (OUTPATIENT)
Dept: OBGYN CLINIC | Age: 24
End: 2024-08-28

## 2024-08-28 VITALS
WEIGHT: 129.2 LBS | HEART RATE: 77 BPM | SYSTOLIC BLOOD PRESSURE: 96 MMHG | BODY MASS INDEX: 22.89 KG/M2 | DIASTOLIC BLOOD PRESSURE: 50 MMHG

## 2024-08-28 DIAGNOSIS — Z34.90 NORMAL REPEAT PREGNANCY, ANTEPARTUM: ICD-10-CM

## 2024-08-28 DIAGNOSIS — O26.52 MATERNAL HYPOTENSION SYNDROME IN SECOND TRIMESTER: ICD-10-CM

## 2024-08-28 DIAGNOSIS — Z3A.18 18 WEEKS GESTATION OF PREGNANCY: Primary | ICD-10-CM

## 2024-08-28 PROBLEM — I95.9 HYPOTENSION: Status: ACTIVE | Noted: 2024-08-28

## 2024-08-28 PROCEDURE — 0502F SUBSEQUENT PRENATAL CARE: CPT | Performed by: ADVANCED PRACTICE MIDWIFE

## 2024-08-28 NOTE — PROGRESS NOTES
[facility] PHYSICIANS [facility] OBSTETRICS AND GYNECOLOGY  [address]   [address]  [facility] [address]  Dept: [phone]      Patient Name: Malaika Ca  Patient : 2000  MRN #: [id number]  [facility] #: [id number]    Date: 2024    Chief Complaint   Patient presents with    Routine Prenatal Visit     Patient's last menstrual period was 2024.    SUBJECTIVE:    Malaika is here for her return OB visit.  She is 18w4d weeks pregnant.   She reports  feeling fetal movement.  She denies vaginal bleeding, vaginal discharge, leaking of fluid.  She denies uterine cramping.  She denies  nausea and/or vomiting.  She denies HA, visual changes, edema, or RUQ pain.     Was in ED for dizziness/ hypotension episode. Feeling better. Focusing on PO intake and activity     OB History    Para Term  AB Living   2 1 1 0 0 1   SAB IAB Ectopic Molar Multiple Live Births   0 0 0 0 0 1      # Outcome Date GA Lbr Rashad/2nd Weight Sex Type Anes PTL Lv   2 Current            1 Term 23 40w3d 02:26 / 03:14 3.26 kg (7 lb 3 oz) M Vag-Vacuum EPI N BRENNON      Obstetric Comments   [id number]-Abiel     Past Medical History:   Diagnosis Date    Hypotension      Past Surgical History:   Procedure Laterality Date    FOOT SURGERY Left      Current Outpatient Medications   Medication Sig Dispense Refill    vitamin D (CHOLECALCIFEROL) 25 MCG (1000 UT) TABS tablet Take 1 tablet by mouth daily 90 tablet 1    Prenatal Vit-Fe Fumarate-FA (PRENATAL VITAMIN) 27-0.8 MG TABS Take 1 tablet by mouth daily       No current facility-administered medications for this visit.     No Known Allergies    OBJECTIVE:  BP (!) 96/50   Pulse 77   Wt 58.6 kg (129 lb 3.2 oz)   LMP 2024   BMI 22.89 kg/m²   Wt Readings from Last 3 Encounters:   24 58.6 kg (129 lb 3.2 oz)   08/15/24 56.7 kg (125 lb)   24 57.4 kg (126 lb 8 oz)     Body mass index is 22.89 kg/m².  Total weight gain:  4.173 kg (9 lb 3.2 oz)    Fundal height- 18  FHR- 145       ASSESSMENT/PLAN:  IUP 18w4d weeks      Pregnancy  Due date is based on LMP and  confirmed with early dating ultrasound  Patient's prenatal labs are  completed. Patient's blood type is A+ and rhogam is not indicated in pregnancy.  Gc/ct- neg/neg  urine culture-neg 7/3/24  UDS - neg  Hep B- NR  Hep C- NR  HIV- NR  H/H/P:13.0/38.9/207  Rubella- immune  T. Pallidum, IgG- NR  Varicella- immune  Electrophoresis- normal  Vit D- 29.5    Patient declined genetic screening.   Anatomy scan: 24    No orders of the defined types were placed in this encounter.      No orders of the defined types were placed in this encounter.      MMW patient           Patient Active Problem List    Diagnosis Date Noted    Low vitamin D level 2024     Overview Note:     Supplement sent   Rpt at 28w:       Normal repeat pregnancy, antepartum 2024     Overview Note:     Genetic screening: declines  AFP:   CF/SMA/FragileX: declines   Electrophoresis: normal  Chart Review Dr. Durán/Nehemiah/Freddy:        VAVD 23 M Apg  Wt 7#3 2023        Education  Enc PO hydration, eating q2hrs, compression socks, increasing salt intake. 9# TWG.   Discussed warning signs and how to contact CNM.     She was counseled regarding all of the above:    Return in about 4 weeks (around 2024) for Return OB.    The patient, Malaika was seen with a total time spent of 30 minutes for the visit on this date of service by the QHCP  Both face-to-face (counseling and education) and non face-to-face time (care coordination & chart review), were spent in determining the total time component.     Electronically Signed By: PILAR Dominguez CNM

## 2024-09-09 ENCOUNTER — CARE COORDINATION (OUTPATIENT)
Dept: OTHER | Facility: CLINIC | Age: 24
End: 2024-09-09

## 2024-09-09 ENCOUNTER — ROUTINE PRENATAL (OUTPATIENT)
Dept: PERINATAL CARE | Age: 24
End: 2024-09-09
Payer: COMMERCIAL

## 2024-09-09 VITALS
DIASTOLIC BLOOD PRESSURE: 69 MMHG | SYSTOLIC BLOOD PRESSURE: 110 MMHG | BODY MASS INDEX: 23.22 KG/M2 | WEIGHT: 136.02 LBS | RESPIRATION RATE: 16 BRPM | TEMPERATURE: 97.9 F | HEART RATE: 69 BPM | HEIGHT: 64 IN

## 2024-09-09 DIAGNOSIS — Z03.72 SUSPECTED PLACENTAL PROBLEM NOT FOUND: ICD-10-CM

## 2024-09-09 DIAGNOSIS — O46.8X2 SUBCHORIONIC HEMATOMA IN SECOND TRIMESTER, SINGLE OR UNSPECIFIED FETUS: ICD-10-CM

## 2024-09-09 DIAGNOSIS — O44.40 LOW IMPLANTATION OF PLACENTA WITHOUT HEMORRHAGE: Primary | ICD-10-CM

## 2024-09-09 DIAGNOSIS — O41.8X20 SUBCHORIONIC HEMATOMA IN SECOND TRIMESTER, SINGLE OR UNSPECIFIED FETUS: ICD-10-CM

## 2024-09-09 DIAGNOSIS — Z36.86 ENCOUNTER FOR SCREENING FOR RISK OF PRE-TERM LABOR: ICD-10-CM

## 2024-09-09 DIAGNOSIS — O09.899 SHORT INTERVAL BETWEEN PREGNANCIES COMPLICATING PREGNANCY, ANTEPARTUM: ICD-10-CM

## 2024-09-09 DIAGNOSIS — Z3A.20 20 WEEKS GESTATION OF PREGNANCY: ICD-10-CM

## 2024-09-09 PROCEDURE — 99999 PR OFFICE/OUTPT VISIT,PROCEDURE ONLY: CPT | Performed by: OBSTETRICS & GYNECOLOGY

## 2024-09-09 PROCEDURE — 76811 OB US DETAILED SNGL FETUS: CPT | Performed by: OBSTETRICS & GYNECOLOGY

## 2024-09-09 PROCEDURE — 76817 TRANSVAGINAL US OBSTETRIC: CPT | Performed by: OBSTETRICS & GYNECOLOGY

## 2024-09-10 PROBLEM — O44.40 LOW-LYING PLACENTA: Status: ACTIVE | Noted: 2024-09-10

## 2024-09-25 ENCOUNTER — ROUTINE PRENATAL (OUTPATIENT)
Dept: OBGYN CLINIC | Age: 24
End: 2024-09-25

## 2024-09-25 VITALS
WEIGHT: 134.3 LBS | BODY MASS INDEX: 23.05 KG/M2 | DIASTOLIC BLOOD PRESSURE: 60 MMHG | SYSTOLIC BLOOD PRESSURE: 108 MMHG | HEART RATE: 86 BPM

## 2024-09-25 DIAGNOSIS — Z34.90 NORMAL REPEAT PREGNANCY, ANTEPARTUM: Primary | ICD-10-CM

## 2024-09-25 DIAGNOSIS — Z3A.22 22 WEEKS GESTATION OF PREGNANCY: ICD-10-CM

## 2024-09-25 DIAGNOSIS — O44.40 LOW-LYING PLACENTA: ICD-10-CM

## 2024-09-25 PROCEDURE — 0502F SUBSEQUENT PRENATAL CARE: CPT | Performed by: ADVANCED PRACTICE MIDWIFE

## 2024-10-07 ENCOUNTER — CARE COORDINATION (OUTPATIENT)
Dept: OTHER | Facility: CLINIC | Age: 24
End: 2024-10-07

## 2024-10-07 NOTE — CARE COORDINATION
Ambulatory Care Coordination Note    ACM attempted to reach patient for Associate Care Management related to Maternity CM. HIPAA compliant message left requesting a return phone call at patient convenience.     Plan for follow-up call in 7-10 days      Future Appointments   Date Time Provider Department Center   10/28/2024  2:20 PM Aida Lyons APRN - CNM Sprg Val [facility]   11/4/2024  7:00 AM ULTRASONOGRAPHER 1 Mat Fetal [facility]   11/25/2024  3:00 PM Jerica Hawkins [facility]       RUSLAN Jennings, RN  Associate Care Manager   Cell: [phone]  [email or web address]

## 2024-10-21 ENCOUNTER — CARE COORDINATION (OUTPATIENT)
Dept: OTHER | Facility: CLINIC | Age: 24
End: 2024-10-21

## 2024-10-21 NOTE — CARE COORDINATION
Ambulatory Care Coordination Note    ACM attempted 2nd outreach to patient for Associate Care Management related to MCM follow-up call. HIPAA compliant message left requesting a return phone call at patient convenience.     Unable to Reach Letter sent to patient via [employer].    Will continue to outreach.      Future Appointments   Date Time Provider Department Center   10/28/2024  2:20 PM Aida Lyons APRN - CNM Sprg Val [facility]   11/4/2024  7:00 AM [facility]   11/25/2024  3:00 PM Jerica Hawkins APRN - CNM [facility]       RUSLAN Jennings, RN  Associate Care Manager   Cell: [phone]  [email or web address]

## 2024-10-28 ENCOUNTER — HOSPITAL ENCOUNTER (OUTPATIENT)
Age: 24
Setting detail: SPECIMEN
Discharge: HOME | End: 2024-10-28

## 2024-10-28 ENCOUNTER — ROUTINE PRENATAL (OUTPATIENT)
Dept: OBGYN CLINIC | Age: 24
End: 2024-10-28
Payer: COMMERCIAL

## 2024-10-28 VITALS
BODY MASS INDEX: 24.1 KG/M2 | DIASTOLIC BLOOD PRESSURE: 52 MMHG | SYSTOLIC BLOOD PRESSURE: 100 MMHG | WEIGHT: 140.4 LBS | HEART RATE: 71 BPM

## 2024-10-28 DIAGNOSIS — Z34.90 NORMAL REPEAT PREGNANCY, ANTEPARTUM: Primary | ICD-10-CM

## 2024-10-28 DIAGNOSIS — O44.40 LOW-LYING PLACENTA: ICD-10-CM

## 2024-10-28 DIAGNOSIS — Z34.90 NORMAL REPEAT PREGNANCY, ANTEPARTUM: ICD-10-CM

## 2024-10-28 LAB
BASOPHILS # BLD: <0.03 K/UL (ref 0–0.2)
BASOPHILS NFR BLD: 0 % (ref 0–2)
EOSINOPHIL # BLD: 0.06 K/UL (ref 0–0.44)
EOSINOPHILS RELATIVE PERCENT: 1 % (ref 1–4)
ERYTHROCYTE [DISTWIDTH] IN BLOOD BY AUTOMATED COUNT: 13.7 % (ref 11.8–14.4)
GLUCOSE 3H P 100 G GLC PO SERPL-MCNC: 188 MG/DL
HCT VFR BLD AUTO: 36.1 % (ref 36.3–47.1)
HGB BLD-MCNC: 12.2 G/DL (ref 11.9–15.1)
IMM GRANULOCYTES # BLD AUTO: <0.03 K/UL (ref 0–0.3)
IMM GRANULOCYTES NFR BLD: 0 %
LYMPHOCYTES NFR BLD: 1.19 K/UL (ref 1.1–3.7)
LYMPHOCYTES RELATIVE PERCENT: 13 % (ref 24–43)
MCH RBC QN AUTO: 35.5 PG (ref 25.2–33.5)
MCHC RBC AUTO-ENTMCNC: 33.8 G/DL (ref 28.4–34.8)
MCV RBC AUTO: 104.9 FL (ref 82.6–102.9)
MONOCYTES NFR BLD: 0.4 K/UL (ref 0.1–1.2)
MONOCYTES NFR BLD: 4 % (ref 3–12)
NEUTROPHILS NFR BLD: 82 % (ref 36–65)
NEUTS SEG NFR BLD: 7.66 K/UL (ref 1.5–8.1)
NRBC BLD-RTO: 0 PER 100 WBC
PLATELET # BLD AUTO: 209 K/UL (ref 138–453)
PMV BLD AUTO: 10.1 FL (ref 8.1–13.5)
RBC # BLD AUTO: 3.44 M/UL (ref 3.95–5.11)
RBC # BLD: ABNORMAL 10*6/UL
WBC OTHER # BLD: 9.3 K/UL (ref 3.5–11.3)

## 2024-10-28 PROCEDURE — 36415 COLL VENOUS BLD VENIPUNCTURE: CPT

## 2024-10-28 PROCEDURE — 0502F SUBSEQUENT PRENATAL CARE: CPT

## 2024-10-28 NOTE — PROGRESS NOTES
Pt is here today at her 27.2 wk appt   Pt states fetal movement is good   Pt has no concerns           [facility]  [address]   [address] [place], [place] [id number]  Dept: [phone]   Patient Name: Malaika Ca  Patient : 2000  MRN #: [id number]  CSN #: [id number]        Visit date:  10/28/2024     Malaika Ca is a 24 y.o. female 27w2d here for 1hr GTT                                          Glucose 50g drink given @ 2:23pm              Glucose drink finished @ 2: 25 pm                 Lot# [id number]              Exp- 26     1hr Draw @ : Location         Pt tolerated procedure well     Mamta Brothers MA

## 2024-10-28 NOTE — PROGRESS NOTES
SUBJECTIVE:  Malaika is here for her return OB visit.  She reports fetal movement.  She denies vaginal bleeding.  She denies vaginal discharge.  She denies leaking of fluid.  She denies uterine contraction activity.  She denies nausea and/or vomiting.  She denies retaining fluid in her extremities.  Malaika reports she is feeling well, no concerns today.    OBJECTIVE:  Blood pressure (!) 100/52, pulse 71, weight 63.7 kg (140 lb 6.4 oz), last menstrual period 2024, not currently breastfeeding.    Pregravid BMI: 20.59   TW.253 kg (20 lb 6.4 oz)    Malaika has not the Tdap vaccine as appropriate, (offer between 27-37 weeks)    Postpartum Depression: Low Risk  (7/3/2024)    [place]  Depression Scale     Last EPDS Total Score: 0     Last EPDS Self Harm Result: Never      ASSESSMENT/PLAN:  IUP @ 27w2d  Malaika will monitor fetal movement daily.  28 week lab panel was discussed and was ordered. Malaika declined repeat HIV and T. Pallidum testing.   Glucola testing was initiated during this visit.   RhoGam was not indicated.  Given 24 week packet     S = D    Malaika was seen today for routine prenatal visit.    Diagnoses and all orders for this visit:    Normal repeat pregnancy, antepartum  -     CBC with Auto Differential; Future  -     Glucose tolerance, 1 hour; Future    Low-lying placenta  Repeat scan     Return in about 2 weeks (around 2024) for OB visit with midwives.     The patient, Malaika Ca, was seen with a total time spent of 32 minutes for the visit on this date [facility]  The time component involved both face-to-face (counseling and education) and non face-to-face time (care coordination), spent in determining the total time component.      On this date 2024,  I have spent greater than 50% of this visit:  [x] Reviewing previous notes/pre-charting  [x] Reviewing test results  [x] Performing necessary physical exam/evaluation  [] Ordering medications, tests,

## 2024-10-29 ENCOUNTER — TELEPHONE (OUTPATIENT)
Dept: OBGYN CLINIC | Age: 24
End: 2024-10-29

## 2024-10-29 DIAGNOSIS — Z34.90 NORMAL REPEAT PREGNANCY, ANTEPARTUM: Primary | ICD-10-CM

## 2024-10-29 PROBLEM — O99.810 ABNORMAL GLUCOSE TOLERANCE TEST (GTT) DURING PREGNANCY, ANTEPARTUM: Status: ACTIVE | Noted: 2024-10-29

## 2024-10-29 NOTE — TELEPHONE ENCOUNTER
Jef Akbar, I sent a separate message to call her before I saw this. Let me know if you have questions.

## 2024-10-29 NOTE — TELEPHONE ENCOUNTER
ROSA CNM Incoming Phone Call from Current OB Patient    Patient Request: patient is calling for her 1 hour glucose test results .   Results are 188  188       Patient Symptoms: n/a       Gestational AGE: 27w3d      If > 20 weeks   N/A call is not regarding to questions below.        Is the baby moving?      Any vaginal bleeding?      Any contractions or cramping?      Any leaking of fluid/ vaginal discharge?      Headache?      Swelling?      Blurry vision?      RUQ pain?     Was she able to take her BP at home?       Last provider visit: 10/28/24      Can this concern wait 24-48hrs to be addressed? Yes       * if not forward message to   * calls concerning BPs, vaginal bleeding, falls/ trauma, urgent psychiatric issues, abdominal/pelvic pain, contractions/ cramping needs to be forwarded to  with verbal communication to CNM on call.

## 2024-10-30 ENCOUNTER — TELEPHONE (OUTPATIENT)
Dept: OBGYN CLINIC | Age: 24
End: 2024-10-30

## 2024-10-30 ENCOUNTER — HOSPITAL ENCOUNTER (OUTPATIENT)
Age: 24
Discharge: HOME | End: 2024-10-30
Payer: COMMERCIAL

## 2024-10-30 DIAGNOSIS — O24.410 DIET CONTROLLED GESTATIONAL DIABETES MELLITUS (GDM) IN SECOND TRIMESTER: ICD-10-CM

## 2024-10-30 DIAGNOSIS — O24.410 DIET CONTROLLED GESTATIONAL DIABETES MELLITUS (GDM) IN SECOND TRIMESTER: Primary | ICD-10-CM

## 2024-10-30 DIAGNOSIS — Z34.90 NORMAL REPEAT PREGNANCY, ANTEPARTUM: ICD-10-CM

## 2024-10-30 LAB
GLUCOSE 2H P 100 G GLC PO SERPL-MCNC: 101 MG/DL
GLUCOSE 3H P 100 G GLC PO SERPL-MCNC: 183 MG/DL
GLUCOSE TOLERANCE TEST 2 HOUR: 144 MG/DL
GLUCOSE TOLERANCE TEST 3 HOUR: 43 MG/DL

## 2024-10-30 PROCEDURE — 36415 COLL VENOUS BLD VENIPUNCTURE: CPT

## 2024-10-30 PROCEDURE — 82951 GLUCOSE TOLERANCE TEST (GTT): CPT

## 2024-10-30 PROCEDURE — 82952 GTT-ADDED SAMPLES: CPT

## 2024-10-30 RX ORDER — GLUCOSAMINE HCL/CHONDROITIN SU 500-400 MG
1 CAPSULE ORAL 4 TIMES DAILY
Qty: 100 STRIP | Refills: 3 | Status: SHIPPED | OUTPATIENT
Start: 2024-10-30 | End: 2024-10-30 | Stop reason: SDUPTHER

## 2024-10-30 RX ORDER — BLOOD-GLUCOSE METER
1 KIT MISCELLANEOUS 4 TIMES DAILY
Qty: 1 KIT | Refills: 0 | Status: SHIPPED | OUTPATIENT
Start: 2024-10-30

## 2024-10-30 RX ORDER — BLOOD PRESSURE TEST KIT
1 KIT MISCELLANEOUS 4 TIMES DAILY
Qty: 100 EACH | Refills: 3 | Status: SHIPPED | OUTPATIENT
Start: 2024-10-30 | End: 2024-10-30 | Stop reason: SDUPTHER

## 2024-10-30 RX ORDER — BLOOD-GLUCOSE METER
1 KIT MISCELLANEOUS 4 TIMES DAILY
Qty: 1 KIT | Refills: 0 | Status: SHIPPED | OUTPATIENT
Start: 2024-10-30 | End: 2024-10-30 | Stop reason: SDUPTHER

## 2024-10-30 RX ORDER — GLUCOSAMINE HCL/CHONDROITIN SU 500-400 MG
1 CAPSULE ORAL 4 TIMES DAILY
Qty: 100 STRIP | Refills: 3 | Status: SHIPPED | OUTPATIENT
Start: 2024-10-30

## 2024-10-30 RX ORDER — BLOOD PRESSURE TEST KIT
1 KIT MISCELLANEOUS 4 TIMES DAILY
Qty: 100 EACH | Refills: 3 | Status: SHIPPED | OUTPATIENT
Start: 2024-10-30

## 2024-10-30 NOTE — TELEPHONE ENCOUNTER
Pt would like glucose monitor strips, kit and alcohol swabs that was sent to Celeste downing to [facility]'s I changed pharmacy in computer. Please advise?

## 2024-10-31 DIAGNOSIS — Z34.90 NORMAL REPEAT PREGNANCY, ANTEPARTUM: ICD-10-CM

## 2024-10-31 DIAGNOSIS — O24.410 DIET CONTROLLED GESTATIONAL DIABETES MELLITUS (GDM) IN SECOND TRIMESTER: Primary | ICD-10-CM

## 2024-10-31 DIAGNOSIS — O99.810 ABNORMAL GLUCOSE TOLERANCE TEST (GTT) DURING PREGNANCY, ANTEPARTUM: ICD-10-CM

## 2024-11-04 ENCOUNTER — ROUTINE PRENATAL (OUTPATIENT)
Dept: PERINATAL CARE | Age: 24
End: 2024-11-04
Payer: COMMERCIAL

## 2024-11-04 VITALS
TEMPERATURE: 97.9 F | RESPIRATION RATE: 16 BRPM | WEIGHT: 142 LBS | SYSTOLIC BLOOD PRESSURE: 103 MMHG | HEART RATE: 79 BPM | DIASTOLIC BLOOD PRESSURE: 61 MMHG | HEIGHT: 64 IN | BODY MASS INDEX: 24.24 KG/M2

## 2024-11-04 DIAGNOSIS — Z36.4 ULTRASOUND FOR ANTENATAL SCREENING FOR FETAL GROWTH RESTRICTION: ICD-10-CM

## 2024-11-04 DIAGNOSIS — O24.410 DIET CONTROLLED GESTATIONAL DIABETES MELLITUS (GDM), ANTEPARTUM: Primary | ICD-10-CM

## 2024-11-04 DIAGNOSIS — Z3A.28 28 WEEKS GESTATION OF PREGNANCY: ICD-10-CM

## 2024-11-04 DIAGNOSIS — O44.40 LOW IMPLANTATION OF PLACENTA WITHOUT HEMORRHAGE: ICD-10-CM

## 2024-11-04 DIAGNOSIS — O09.899 SHORT INTERVAL BETWEEN PREGNANCIES COMPLICATING PREGNANCY, ANTEPARTUM: ICD-10-CM

## 2024-11-04 DIAGNOSIS — Z36.3 ENCOUNTER FOR ROUTINE SCREENING FOR MALFORMATION USING ULTRASONICS: ICD-10-CM

## 2024-11-04 PROCEDURE — 76805 OB US >/= 14 WKS SNGL FETUS: CPT | Performed by: OBSTETRICS & GYNECOLOGY

## 2024-11-04 PROCEDURE — 99203 OFFICE O/P NEW LOW 30 MIN: CPT | Performed by: OBSTETRICS & GYNECOLOGY

## 2024-11-04 PROCEDURE — 76819 FETAL BIOPHYS PROFIL W/O NST: CPT | Performed by: OBSTETRICS & GYNECOLOGY

## 2024-11-04 PROCEDURE — 76817 TRANSVAGINAL US OBSTETRIC: CPT | Performed by: OBSTETRICS & GYNECOLOGY

## 2024-11-04 NOTE — PROGRESS NOTES
Malaika Ca, was evaluated through a synchronous (real-time) audio-video encounter. The patient (or guardian if applicable) is aware that this is a billable service, which includes applicable co-pays. This Virtual Visit was conducted with patient's (and/or legal guardian's) consent. Patient identification was verified, and a caregiver was present when appropriate.   The patient was located at Facility ([facility] Department): [address] [place] [address] [address]  [place] [address]  Provider was located at Home ([facility] Dept State): [place].  Confirm you are appropriately licensed, registered, or certified to deliver care in the state where the patient is located as indicated above. If you are not or unsure, please re-schedule the visit: Yes, I confirm.      Total time spent for this encounter:  approximately 30 minutes (see dictation).    --Estrada Lucio MD on 11/4/2024 at 8:38 AM    An electronic signature was used to authenticate this note.

## 2024-11-06 ENCOUNTER — HOSPITAL ENCOUNTER (OUTPATIENT)
Dept: DIABETES SERVICES | Age: 24
Setting detail: THERAPIES SERIES
Discharge: HOME | End: 2024-11-06
Payer: COMMERCIAL

## 2024-11-06 PROCEDURE — G0108 DIAB MANAGE TRN  PER INDIV: HCPCS

## 2024-11-06 NOTE — PROGRESS NOTES
Gestational Diabetes Mellitus (GDM) and Post-Partum Diabetes Risk Progress Note:  GDM Screening Flow sheet     Patient, Malaika Ca, here for diabetes self-management education visit.  Today's visit was in an individual setting.    MEDICAL HIS  Past Medical History:   Diagnosis Date    Diet controlled gestational diabetes mellitus (GDM) in second trimester 10/30/2024    Hypotension      Family History   Problem Relation Age of Onset    Colon Cancer Paternal Grandfather     Heart Surgery Paternal Grandfather         Triple Bypass    Diabetes Maternal Grandmother     Hypertension Father      Labor Mother         delivered me at 32 weeks    Epilepsy Brother      Patient has no known allergies.   Immunization History   Administered Date(s) Administered    COVID-19, PFIZER GRAY top, DO NOT Dilute, (age 12 y+), IM, 30 mcg/0.3 mL 2022    COVID-19, PFIZER PURPLE top, DILUTE for use, (age 12 y+), 30mcg/0.3mL 2021, 10/13/2021    DTP 2000, 1998, 1998, 1999, 2002    HPV Quadrivalent (Gardasil) 2010, 10/01/2010, 2011    Hep B, Dialysis/Immuno, RECOMBIVAX-HB, (age 18y+), IM, 1mL 2018    Hep B, ENGERIX-B, RECOMBIVAX-HB, (age Birth - 19y), IM, 0.5mL 1998, 1998, 1998    Hib vaccine 1998, 1998, 1999    Influenza, FLUARIX, FLULAVAL, FLUZONE (age 6 mo+) and AFLURIA, (age 3 y+), Quadv PF, 0.5mL 10/20/2017, 2018, 09/10/2019, 2020    Influenza, FLUCELVAX, (age 6 mo+), MDCK, Quadv PF, 0.5mL 2023    MMR, PRIORIX, M-M-R II, (age 12m+), SC, 0.5mL 1999, 2002    Meningococcal ACWY, MENVEO (MenACWY-CRM), (age 2m-55y), IM, 0.5mL 2010    Polio Virus Vaccine 1998, 1998, 1999, 2002    TDaP, ADACEL (age 10y-64y), BOOSTRIX (age 10y+), IM, 0.5mL 2010, 2018, 05/15/2023    Varicella, VARIVAX, (age 12m+), SC, 0.5mL 1999, 2010     Current Medications  Current Outpatient

## 2024-11-10 ENCOUNTER — PATIENT MESSAGE (OUTPATIENT)
Dept: PERINATAL CARE | Age: 24
End: 2024-11-10

## 2024-11-11 DIAGNOSIS — O24.410 DIET CONTROLLED GESTATIONAL DIABETES MELLITUS (GDM) IN SECOND TRIMESTER: ICD-10-CM

## 2024-11-11 RX ORDER — LANCETS 28 GAUGE
EACH MISCELLANEOUS
Qty: 100 EACH | Refills: 0 | Status: SHIPPED | OUTPATIENT
Start: 2024-11-11

## 2024-11-11 RX ORDER — GLUCOSAMINE HCL/CHONDROITIN SU 500-400 MG
1 CAPSULE ORAL 4 TIMES DAILY
Qty: 100 STRIP | Refills: 3 | OUTPATIENT
Start: 2024-11-11

## 2024-11-11 NOTE — TELEPHONE ENCOUNTER
Malaika Ca is requesting a refill on 11/11/24.       Currently Pregnant yes   Last OB visit: 10/28/24  Next OB visit: 11/25/24    Last prescribing provider:  Joselyn DUNHAM

## 2024-11-11 NOTE — TELEPHONE ENCOUNTER
Dr. Lucio reviewed blood sugar log and ordered pt to begin NPH insulin at bedtime.  Pt notified by phone, agreed and asked to have called into [facility] out-pt Pharmacy.  Writer called into above pharmacy, [phone], Daren, NPH insulin. 6u at bedtime daily, 1mos supply with needles, no refills.

## 2024-11-17 ENCOUNTER — PATIENT MESSAGE (OUTPATIENT)
Dept: PERINATAL CARE | Age: 24
End: 2024-11-17

## 2024-11-18 NOTE — TELEPHONE ENCOUNTER
Dr. Lucio reviewed blood sugar log and ordered NPH insulin to be increased to 10u at bedtime.  Pt notified by phone, verbalized back without questions.

## 2024-11-20 ENCOUNTER — HOSPITAL ENCOUNTER (OUTPATIENT)
Dept: DIABETES SERVICES | Age: 24
Setting detail: THERAPIES SERIES
Discharge: HOME | End: 2024-11-20
Payer: COMMERCIAL

## 2024-11-20 DIAGNOSIS — O24.410 DIET CONTROLLED GESTATIONAL DIABETES MELLITUS (GDM) IN SECOND TRIMESTER: ICD-10-CM

## 2024-11-20 DIAGNOSIS — O99.810 ABNORMAL GLUCOSE TOLERANCE TEST (GTT) DURING PREGNANCY, ANTEPARTUM: Primary | ICD-10-CM

## 2024-11-20 PROCEDURE — G0108 DIAB MANAGE TRN  PER INDIV: HCPCS

## 2024-11-21 NOTE — PROGRESS NOTES
Gestational Diabetes Mellitus (GDM) and Post-Partum Diabetes Risk Progress Note:  GDM Screening Flow sheet     Patient, Malaika Ca, here for diabetes self-management education visit.  Today's visit was in an individual setting.    MEDICAL HIS  Past Medical History:   Diagnosis Date    Diet controlled gestational diabetes mellitus (GDM) in second trimester 10/30/2024    Hypotension      Family History   Problem Relation Age of Onset    Colon Cancer Paternal Grandfather     Heart Surgery Paternal Grandfather         Triple Bypass    Diabetes Maternal Grandmother     Hypertension Father      Labor Mother         delivered me at 32 weeks    Epilepsy Brother      Patient has no known allergies.   Immunization History   Administered Date(s) Administered    COVID-19, PFIZER GRAY top, DO NOT Dilute, (age 12 y+), IM, 30 mcg/0.3 mL 2022    COVID-19, PFIZER PURPLE top, DILUTE for use, (age 12 y+), 30mcg/0.3mL 2021, 10/13/2021    DTP 1998, 1998, 1998, 1999, 2002    HPV Quadrivalent (Gardasil) 2010, 10/01/2010, 2011    Hep B, Dialysis/Immuno, RECOMBIVAX-HB, (age 18y+), IM, 1mL 2018    Hep B, ENGERIX-B, RECOMBIVAX-HB, (age Birth - 19y), IM, 0.5mL 1998, 1998, 1998    Hib vaccine 1998, 1998, 1999    Influenza, FLUARIX, FLULAVAL, FLUZONE (age 6 mo+) and AFLURIA, (age 3 y+), Quadv PF, 0.5mL 10/20/2017, 2018, 09/10/2019, 2020    Influenza, FLUCELVAX, (age 6 mo+) IM, Trivalent PF, 0.5mL 2024    Influenza, FLUCELVAX, (age 6 mo+), MDCK, Quadv PF, 0.5mL 2023    MMR, PRIORIX, M-M-R II, (age 12m+), SC, 0.5mL 1999, 2002    Meningococcal ACWY, MENVEO (MenACWY-CRM), (age 2m-55y), IM, 0.5mL 2010    Polio Virus Vaccine 1998, 1998, 1999, 2002    TDaP, ADACEL (age 10y-64y), BOOSTRIX (age 10y+), IM, 0.5mL 2010, 2018, 05/15/2023    Varicella, VARIVAX, (age 12m+), SC,

## 2024-11-22 ENCOUNTER — PATIENT MESSAGE (OUTPATIENT)
Dept: PERINATAL CARE | Age: 24
End: 2024-11-22

## 2024-11-22 NOTE — PROGRESS NOTES
Pt is here today at her 31.2 pnv   Pt states fetal movement is good  Pt would like to discuss her medication for humulin     Tdap ? yes  FINGER STICKS , yes , pt turns RAMON joshi into MFM      Flu vaccine done 11/8/24 at work.

## 2024-11-25 ENCOUNTER — ROUTINE PRENATAL (OUTPATIENT)
Dept: OBGYN CLINIC | Age: 24
End: 2024-11-25
Payer: COMMERCIAL

## 2024-11-25 VITALS
SYSTOLIC BLOOD PRESSURE: 104 MMHG | BODY MASS INDEX: 24.15 KG/M2 | DIASTOLIC BLOOD PRESSURE: 62 MMHG | HEART RATE: 82 BPM | WEIGHT: 140.7 LBS

## 2024-11-25 DIAGNOSIS — Z23 NEED FOR INFLUENZA VACCINATION: ICD-10-CM

## 2024-11-25 DIAGNOSIS — Z3A.31 31 WEEKS GESTATION OF PREGNANCY: ICD-10-CM

## 2024-11-25 DIAGNOSIS — O24.414 INSULIN CONTROLLED GESTATIONAL DIABETES MELLITUS (GDM) IN THIRD TRIMESTER: ICD-10-CM

## 2024-11-25 DIAGNOSIS — O44.40 LOW-LYING PLACENTA: ICD-10-CM

## 2024-11-25 DIAGNOSIS — O09.93 HIGH-RISK PREGNANCY IN THIRD TRIMESTER: Primary | ICD-10-CM

## 2024-11-25 DIAGNOSIS — Z23 NEED FOR DIPHTHERIA-TETANUS-PERTUSSIS (TDAP) VACCINE: ICD-10-CM

## 2024-11-25 PROCEDURE — 90715 TDAP VACCINE 7 YRS/> IM: CPT | Performed by: ADVANCED PRACTICE MIDWIFE

## 2024-11-25 PROCEDURE — 90471 IMMUNIZATION ADMIN: CPT | Performed by: ADVANCED PRACTICE MIDWIFE

## 2024-11-25 PROCEDURE — 0502F SUBSEQUENT PRENATAL CARE: CPT | Performed by: ADVANCED PRACTICE MIDWIFE

## 2024-11-25 RX ORDER — PEN NEEDLE, DIABETIC 30 GX3/16"
NEEDLE, DISPOSABLE MISCELLANEOUS
COMMUNITY
Start: 2024-11-11

## 2024-11-25 RX ORDER — INSULIN HUMAN 100 [IU]/ML
12 INJECTION, SUSPENSION SUBCUTANEOUS NIGHTLY
COMMUNITY
Start: 2024-11-11

## 2024-11-25 NOTE — PROGRESS NOTES
SUBJECTIVE:  Malaika is here for her return OB visit.  She reports fetal movement.  She denies vaginal bleeding.  She denies vaginal discharge.  She denies leaking of fluid.  She denies uterine contraction activity.  She denies nausea and/or vomiting.  She denies retaining fluid in her extremities.  She denies headache, vision changes, RUQ pain, and epigastric pain.  Malaika reports she submitted BS logs yesterday, they recommended increase in NPH from 10u to 12 u but has noticed last few days fastings have been <90 on 10u, see Plan.       OBJECTIVE:  Blood pressure 104/62, pulse 82, weight 63.8 kg (140 lb 11.2 oz), last menstrual period 2024, not currently breastfeeding.     Pregravid BMI: 20.59   TW.389 kg (20 lb 11.2 oz)      Tdap - received today  Influenza vaccine - received at work   Rhogam was not indicated      ASSESSMENT/PLAN:  IUP @ 31w2d  Malaika will monitor fetal movement daily.   [x] 28 week lab results were reviewed. Glucola failed 3 hour, Hgb 12.2.   [x] Fetal Kick Count was discussed  [] [place] contractions vs  labor contractions were reviewed.  [] Signs and symptoms of Pre-Eclampsia were not reviewed and discussed.  [x] Initial discussion regarding birth plans was begun.  [] Given 36 week birth packet for review.    S = D    High-risk pregnancy in third trimester  Tdap, BOOSTRIX, (age 10 yrs+), IM  THER/PROPH/DIAG INJECTION, SUBCUT/IM  24 EFW 48% AC 53% MADHAVI 16.06 cm, footling breech, low lying placenta (28mm from internal os) - f/u in 4 weeks    Insulin controlled gestational diabetes mellitus (GDM) in third trimester  Dx per 3 hour  Current treatment: 0/0/0/10 - plans to stay on 10u for a few more days to monitor and will increase to 12u if values increasing   Reviewed fetal kick counts   Continue tracking blood sugars 4x/day (fasting and 2 hour postprandial) and submit weekly to [facility]  Growth U/S every 3-4 weeks  Last growth U/S 24  Fetal surveillance: NST weekly with

## 2024-11-25 NOTE — TELEPHONE ENCOUNTER
Dr. Lucio reviewed blood sugar log and ordered to increase NPH insulin to 12u.  Pt notified by phone, instructed to bring blood sugars in at next M visit, berbalized back understanding.

## 2024-12-04 ENCOUNTER — ROUTINE PRENATAL (OUTPATIENT)
Dept: PERINATAL CARE | Age: 24
End: 2024-12-04
Payer: COMMERCIAL

## 2024-12-04 VITALS
HEART RATE: 78 BPM | TEMPERATURE: 98.1 F | HEIGHT: 64 IN | WEIGHT: 141 LBS | SYSTOLIC BLOOD PRESSURE: 104 MMHG | DIASTOLIC BLOOD PRESSURE: 63 MMHG | RESPIRATION RATE: 16 BRPM | BODY MASS INDEX: 24.07 KG/M2

## 2024-12-04 DIAGNOSIS — O35.07X0 FETAL MICROCEPHALY AFFECTING ANTEPARTUM CARE OF MOTHER, SINGLE OR UNSPECIFIED FETUS: ICD-10-CM

## 2024-12-04 DIAGNOSIS — Z3A.32 32 WEEKS GESTATION OF PREGNANCY: ICD-10-CM

## 2024-12-04 DIAGNOSIS — O44.40 LOW IMPLANTATION OF PLACENTA WITHOUT HEMORRHAGE: ICD-10-CM

## 2024-12-04 DIAGNOSIS — O09.899 SHORT INTERVAL BETWEEN PREGNANCIES COMPLICATING PREGNANCY, ANTEPARTUM: ICD-10-CM

## 2024-12-04 DIAGNOSIS — Z03.72 SUSPECTED PLACENTAL PROBLEM NOT FOUND: ICD-10-CM

## 2024-12-04 DIAGNOSIS — Z36.4 ULTRASOUND FOR ANTENATAL SCREENING FOR FETAL GROWTH RESTRICTION: ICD-10-CM

## 2024-12-04 DIAGNOSIS — O24.414 INSULIN CONTROLLED GESTATIONAL DIABETES MELLITUS (GDM) DURING PREGNANCY, ANTEPARTUM: Primary | ICD-10-CM

## 2024-12-04 PROCEDURE — 76816 OB US FOLLOW-UP PER FETUS: CPT | Performed by: OBSTETRICS & GYNECOLOGY

## 2024-12-04 PROCEDURE — 76817 TRANSVAGINAL US OBSTETRIC: CPT | Performed by: OBSTETRICS & GYNECOLOGY

## 2024-12-04 PROCEDURE — 99999 PR OFFICE/OUTPT VISIT,PROCEDURE ONLY: CPT | Performed by: OBSTETRICS & GYNECOLOGY

## 2024-12-04 PROCEDURE — 76819 FETAL BIOPHYS PROFIL W/O NST: CPT | Performed by: OBSTETRICS & GYNECOLOGY

## 2024-12-04 NOTE — PROGRESS NOTES
Blood sugars reviewed (adjustments made in insulin regimen as below).   Insulin regimen adjusted to: NPH Insulin subcutaneously 14 units before bedtime.    Please continue to send blood glucose monitoring information to [facility] office so that insulin and/or dietary changes can be made if necessary weekly.  Diabetic education/nutrition counseling advised.   Start recommended ADA diet therapy for diabetes.   Compliance with regular prenatal care and blood sugar monitoring strongly encouraged.      Strongly advised patient to be compliant with blood sugar monitoring as previously advised to minimize the potential of adverse  outcomes (e.g. fetal demise/stillbirth, polyhydramnios/oligohydramnios, fetal growth abnormalities, pregnancy loss, hypertensive disorders of pregnancy, indicated  delivery, etc.), potential maternal morbidities, etc.     Patient previously declined non-invasive prenatal diagnosis testing (with [facility]).    Please refer to maternal carrier testing ([facility]'s Horizon Carrier Screen) completed in previous pregnancy.      Options with respect to offering the patient invasive genetic prenatal testing, non-invasive prenatal testing (NIPT/NIPS), and evaluation for various  infections could not be completed today, as the patient declines a Maternal-Fetal Medicine physician follow-up consultation today.     Patient declines a Maternal-Fetal Medicine complete physician follow-up consultation today regarding the fetal and/or maternal medical/obstetrical complications/co-morbidities of pregnancy (specifically for fetal microcephaly).      Maternal-Fetal Medicine (MFM) attending physician will defer all management for these medical/obstetrical complications of pregnancy to the primary attending obstetrical physician/provider, as a result.  Therefore, only an ultrasound evaluation was completed today in the MFM office.

## 2024-12-06 ENCOUNTER — TELEPHONE (OUTPATIENT)
Dept: OBGYN CLINIC | Age: 24
End: 2024-12-06

## 2024-12-06 NOTE — TELEPHONE ENCOUNTER
Patient said that had MFM on 11/04 Dr Lucio stated for her to contact the midwives to see about NSTs weekly or how we would want to see her. Please advise?

## 2024-12-09 NOTE — TELEPHONE ENCOUNTER
IMELDAI    Writer called patient to schedule out BPP/ nst  patient is currently scheduled for 12/16 with Jerica. Patient stated she is only able to do Fridays and will need to talk to her  before scheduling appointment. Patient stated she will talk with the provider at her next visit.

## 2024-12-13 NOTE — PROGRESS NOTES
Pt is here today at her 34 weeks  Pt states fetal movement is moves daily  Pt has no concerns     NST started:11:32

## 2024-12-16 ENCOUNTER — ROUTINE PRENATAL (OUTPATIENT)
Dept: OBGYN CLINIC | Age: 24
End: 2024-12-16
Payer: COMMERCIAL

## 2024-12-16 VITALS
DIASTOLIC BLOOD PRESSURE: 71 MMHG | SYSTOLIC BLOOD PRESSURE: 106 MMHG | BODY MASS INDEX: 23.97 KG/M2 | HEIGHT: 64 IN | HEART RATE: 83 BPM | WEIGHT: 140.4 LBS

## 2024-12-16 DIAGNOSIS — O09.93 HIGH-RISK PREGNANCY IN THIRD TRIMESTER: Primary | ICD-10-CM

## 2024-12-16 DIAGNOSIS — Z3A.34 34 WEEKS GESTATION OF PREGNANCY: ICD-10-CM

## 2024-12-16 DIAGNOSIS — Z36.89 NST (NON-STRESS TEST) REACTIVE ON FETAL SURVEILLANCE: ICD-10-CM

## 2024-12-16 DIAGNOSIS — O24.414 INSULIN CONTROLLED GESTATIONAL DIABETES MELLITUS (GDM) IN THIRD TRIMESTER: ICD-10-CM

## 2024-12-16 DIAGNOSIS — O24.410 DIET CONTROLLED GESTATIONAL DIABETES MELLITUS (GDM) IN SECOND TRIMESTER: ICD-10-CM

## 2024-12-16 PROCEDURE — 0502F SUBSEQUENT PRENATAL CARE: CPT

## 2024-12-16 PROCEDURE — 59025 FETAL NON-STRESS TEST: CPT

## 2024-12-16 NOTE — PROGRESS NOTES
SUBJECTIVE:  Malaika is here for her routine OB visit.  She reports fetal movement.  She denies vaginal bleeding.  She denies leaking of fluid.  She denies vaginal discharge.  She denies uterine contraction activity.  She denies nausea and/or vomiting.  She denies retaining fluid in her extremities  She denies headache, visual changes, epigastric discomfort  Malaika reports feeling well today.     Discussed when she can start pumping for colostrum, cnm said once she is term she can go ahead and start doing that.     Discussed her window for induction and they decided on 24.  Last BS log sent 12/10/24-       OBJECTIVE:   Last menstrual period 2024, not currently breastfeeding.    Pregravid BMI: 20.59   TW.526 kg (21 lb)    SVE:  deferred    NST:   Baseline:  125  Variability:  Moderate  Accelerations:  Present  Decelerations: Absent   Fetal Movement:  Perceived by patient during NST  Contractions: patient denies contractions, contractions Absent  on toco.  Interpretation: Reactive (Category1)  Time: 20 minutes     Tdap - already received   Influenza vaccine - undecided  Rhogam was not indicated    ASSESSMENT/PLAN:  IUP @ 34w2d  Malaika will monitor for fetal movements daily  []  GBS culture NOV  [x]  28 week lab results were reviewed. Glucola - GDM, Hgb 12.2.   [x]  Signs of labor to report were  and when to call midwives was discussed  []  Birth preferences were not discussed.  []  Has been given 36 week birth packet for review.  [x]  Signs and symptoms of Pre-Eclampsia were reviewed and discussed.  []  Post-dates testing and protocol were not discussed  []  Malaika was not counseled regarding Post Partum Depression and help  [x]  She has not decided on contraceptive choice.  []  Chart has been reviewed by collaborating physician.     Diagnoses and all orders for this visit:    High-risk pregnancy in third trimester    34 weeks gestation of pregnancy  S = D    Insulin controlled gestational

## 2024-12-16 NOTE — PROGRESS NOTES
SUBJECTIVE:  Malaika is here for her routine OB visit.  She reports fetal movement.  She denies vaginal bleeding.  She denies leaking of fluid.  She denies vaginal discharge.  She denies uterine contraction activity.  She denies nausea and/or vomiting.  She denies retaining fluid in her extremities  She denies headache, visual changes, epigastric discomfort  Malaika reports ***    Last BS log sent 12/10/24- 0//24 EFW 41% AC 80% MADHAVI 11.08 cm, cephalic - fetal microcephaly     Fetal surveillance initiated     IOL: GDM (on meds): 39-39+6/7  - (2024) aware/agreeable       OBJECTIVE:   Last menstrual period 2024, not currently breastfeeding.    Pregravid BMI: 20.59   TW.526 kg (21 lb)    SVE:  deferred    NST:   Baseline:  ***  Variability:  Moderate  Accelerations:  {PRESENT/ABSENT:28160}  Decelerations: {PRESENT/ABSENT:70999}  Fetal Movement:  Perceived by patient during NST  Contractions: patient {Actions; denies-reports:084970} contractions, contractions {PRESENT/ABSENT:36895} on toco.  Interpretation: Reactive (Category1)  Time: 20 minutes***     Tdap - already received   Influenza vaccine - {LKvaccine:14313}  Rhogam was not indicated    ASSESSMENT/PLAN:  IUP @ 34w2d  Malaika will monitor for fetal movements daily  []  GBS culture NOV  [x]  28 week lab results were reviewed. Glucola - GDM, Hgb 12.2.   []  Signs of labor to report {WERE / WERE NOT:83228}  and when to call midwives was discussed  []  Birth preferences {WERE / WERE NOT:76760} discussed.  []  Has been given 36 week birth packet for review.  []  Signs and symptoms of Pre-Eclampsia {WERE / WERE NOT:02991} reviewed and discussed.  []  Post-dates testing and protocol {WERE / WERE NOT:45090} discussed  []  Malaika {WAS/WAS NOT:8669276016} counseled regarding Post Partum Depression and help  []  She {HAS HAS NOT:16580} decided on contraceptive choice.  []  Chart has been reviewed by collaborating physician.   S *** D  Diagnoses and all

## 2024-12-19 DIAGNOSIS — R79.89 LOW VITAMIN D LEVEL: ICD-10-CM

## 2024-12-20 ENCOUNTER — TELEPHONE (OUTPATIENT)
Dept: OBGYN CLINIC | Age: 24
End: 2024-12-20

## 2024-12-20 NOTE — TELEPHONE ENCOUNTER
CNM spoke to pt at 1254- pt c/o RUQ pain that started about 2 hrs ago. Sharp, intermittent 6/10 pain, started after she ate lunch. + FM, denies contractions. Pt reports she has been drinking, denies constipation. Encouraged pt to take shower, call back afterwards to see if pain improved. Pt called back @1356 stated pain is much improved but not completely gone. Enc pt to call back if pain gets worse again. BP WNL, enc hydration, rest. Discussed monitoring fat intake/ possible gallbladder issue.

## 2024-12-20 NOTE — TELEPHONE ENCOUNTER
Malaika Ca is requesting a refill on vitamin D.    Currently Pregnant yes  Last OB visit: 12/16/24  Next OB visit: 12/26/24    Last prescribing provider:  Joselyn Womack

## 2024-12-20 NOTE — TELEPHONE ENCOUNTER
ROSA CNM Incoming Phone Call from Current OB Patient    Patient Request: LUQ Pain that is coming and going for the last couple of hours.       Patient Symptoms: LUQ Pain      Gestational AGE: 34w6d      If > 20 weeks     Is the baby moving?Yes       Any vaginal bleeding?No      Any contractions or cramping?No      Any leaking of fluid/ vaginal discharge?No      Headache?Yes       Swelling?No      Blurry vision?No      RUQ pain?No      Was she able to take her BP at home?Yes, she took this morning and was 110/72       Last provider visit: 12/16/24      Can this concern wait 24-48hrs to be addressed? No       * if not forward message to   * calls concerning BPs, vaginal bleeding, falls/ trauma, urgent psychiatric issues, abdominal/pelvic pain, contractions/ cramping needs to be forwarded to  with verbal communication to CNM on call.

## 2024-12-23 ENCOUNTER — TELEPHONE (OUTPATIENT)
Dept: OBGYN CLINIC | Age: 24
End: 2024-12-23

## 2024-12-23 ENCOUNTER — PATIENT MESSAGE (OUTPATIENT)
Dept: PERINATAL CARE | Age: 24
End: 2024-12-23

## 2024-12-23 NOTE — TELEPHONE ENCOUNTER
ROSA CNM Incoming Phone Call from Current OB Patient    Patient Request: Advice - Pt has had a cold since last Thursday. She has been taking robitussin since Saturday. Cold sx not getting any better, unable to sleep even when sleeping sat up. Wants to know what else she can take?      Patient Symptoms: Cough, Congestion, Sore throat       Gestational AGE: 35w2d      If > 20 weeks     Is the baby moving?Yes       Any vaginal bleeding?No      Any contractions or cramping?No      Any leaking of fluid/ vaginal discharge? No       Headache?Yes       Swelling?No      Blurry vision?No      RUQ pain?No      Was she able to take her BP at home? No       Last provider visit: 12/16/24      Can this concern wait 24-48hrs to be addressed? Yes      * if not forward message to   * calls concerning BPs, vaginal bleeding, falls/ trauma, urgent psychiatric issues, abdominal/pelvic pain, contractions/ cramping needs to be forwarded to  with verbal communication to CNM on call.

## 2024-12-23 NOTE — TELEPHONE ENCOUNTER
Dr. Lucio reviewed blood sugar log and there are no new orders.  Pt notified by phone to continue 14u of NPH insulin at bedtime, verbalized understanding without questions.

## 2024-12-25 PROBLEM — Z23 NEED FOR INFLUENZA VACCINATION: Status: RESOLVED | Noted: 2024-11-25 | Resolved: 2024-12-25

## 2024-12-26 ENCOUNTER — HOSPITAL ENCOUNTER (OUTPATIENT)
Age: 24
Setting detail: SPECIMEN
Discharge: HOME | End: 2024-12-26

## 2024-12-26 ENCOUNTER — ROUTINE PRENATAL (OUTPATIENT)
Dept: OBGYN CLINIC | Age: 24
End: 2024-12-26

## 2024-12-26 VITALS
DIASTOLIC BLOOD PRESSURE: 72 MMHG | BODY MASS INDEX: 23.88 KG/M2 | SYSTOLIC BLOOD PRESSURE: 109 MMHG | HEART RATE: 87 BPM | WEIGHT: 139.1 LBS

## 2024-12-26 DIAGNOSIS — O24.414 INSULIN CONTROLLED GESTATIONAL DIABETES MELLITUS (GDM) IN THIRD TRIMESTER: ICD-10-CM

## 2024-12-26 DIAGNOSIS — O09.93 HIGH-RISK PREGNANCY IN THIRD TRIMESTER: ICD-10-CM

## 2024-12-26 DIAGNOSIS — Z36.89 NST (NON-STRESS TEST) REACTIVE ON FETAL SURVEILLANCE: ICD-10-CM

## 2024-12-26 DIAGNOSIS — Z3A.35 35 WEEKS GESTATION OF PREGNANCY: ICD-10-CM

## 2024-12-26 DIAGNOSIS — O09.93 HIGH-RISK PREGNANCY IN THIRD TRIMESTER: Primary | ICD-10-CM

## 2024-12-26 DIAGNOSIS — O35.07X0 MICROCEPHALY OF FETUS: ICD-10-CM

## 2024-12-26 PROCEDURE — 0502F SUBSEQUENT PRENATAL CARE: CPT | Performed by: ADVANCED PRACTICE MIDWIFE

## 2024-12-26 NOTE — PROGRESS NOTES
Pt is here today at her 35.5 pnv W/ BPP and NST.   Pt states fetal movement is good   Pt has no concerns   NST start time 11:17 am   
Screen, Vaginal/Rectal; Future  -     12/4/24 EFW 41% BPD <3% HC 3% AC 80% MADHAVI 11.08 cm, cephalic - fetal microcephaly - f/u in 4 weeks    NST (non-stress test) reactive on fetal surveillance    Insulin controlled gestational diabetes mellitus (GDM) in third trimester  Fetal nonstress test; Future  Current treatment: 0/0/0/14  Taking ASA  Reviewed fetal kick counts   Continue tracking blood sugars 4x/day (fasting and 2 hour postprandial) and submit weekly to MFM - last sent 12/23/24  Growth U/S every 3-4 weeks  Last growth U/S 12/4/24 see above  Fetal surveillance: NST weekly with CNM and BPP/UAD weekly with MFM at 32 weeks until delivery  IOL window:   GDM (on meds): 39-39+6/7 - already has scheduled 1/19/24 at 1200, reviewed SVE at visit prior       []  Induction management was discussed and induction scheduled see above    Return in about 1 week (around 1/2/2025) for BPP/NST with midwives.     Malaika was counseled regarding all of the above    The patient, Malaika Ca, was seen with a total time spent of 31 minutes for the visit on this date of service by the [facility]  The time component involved both face-to-face (counseling and education) and non face-to-face time (care coordination), spent in determining the total time component.     On this date December 26, 2024, I have spent greater than 50% of this visit:  [x] Reviewing previous notes/pre-charting  [x] Reviewing test results  [x] Performing necessary physical exam/evaluation  [x] Ordering medications, tests, procedures  [] Placing referrals or communicating with other HCP  [x] Documenting and updating Problem List as necessary  [x] Importance of compliance with treatment plan discussed  [x] Counseling patient/support person  [] Coordinating care    Electronically signed by: PILAR Gil CNM

## 2024-12-27 LAB
MICROORGANISM SPEC CULT: ABNORMAL
SERVICE CMNT-IMP: ABNORMAL
SPECIMEN DESCRIPTION: ABNORMAL

## 2024-12-29 PROBLEM — B95.1 POSITIVE GBS TEST: Status: ACTIVE | Noted: 2024-12-29

## 2024-12-30 NOTE — PROGRESS NOTES
SUBJECTIVE:  Here for NST for fetal surveillance secondary to GDMa2  She  has no complaints today but is ready for labor!  Reports fetal movement is present    OBJECTIVE:  Vitals:    01/02/25 1116   BP: 106/64   Pulse: 68   Weight: 64.2 kg (141 lb 8 oz)        LMP: Patient's last menstrual period was 04/20/2024.     Baseline:  125  Variability:  Moderate  Accelerations:  Present  Decelerations:  Absent  Fetal Movement:  Perceived by patient during NST  Contractions:  cramping  Stimulation needed/kind: none    Total time run: 25 minutes  INTERPRETED by: SHANNAN Joseph CNM    BPP= 8/8, MADHAVI= 12.17/MVP 5.85    ASSESSMENT/PLAN:  1. 36 weeks gestation of pregnancy  REACTIVE NST (Category 1 tracing and/or appropriate for gestational age)    2. High-risk pregnancy in third trimester  - Reviewed birth plan with no updates at this time  - Fetal movement reviewed    3. Insulin controlled gestational diabetes mellitus (GDM) in third trimester  - Reports BS continue stable  - Has last M appt scheudled    4. Positive GBS test  - GBS discussed and protocol for labor reviewed        The patient, Malaika Ca,  was seen with a total time spent of 30 minutes for the visit on this date [facility]  The time component involved both face-to-face (counseling and education) and non face-to-face time (care coordination), spent in determining the total time component.     On this date January 2, 2025,  I have spent greater than 50% of this visit:  [x] Reviewing previous notes/pre-charting  [x] Reviewing test results with patient and/or support person  [] Performing necessary physical exam/evaluation  [x] Ordering medications, tests, procedures  [] Placing referrals or communicating with other HCP  [x] Documenting and updating Problem List as necessary  [x] Importance of compliance with treatment plan discussed  [x] Counseling patient and/or support person  [x] Coordinating care

## 2025-01-02 ENCOUNTER — ROUTINE PRENATAL (OUTPATIENT)
Dept: OBGYN CLINIC | Age: 25
End: 2025-01-02

## 2025-01-02 VITALS
DIASTOLIC BLOOD PRESSURE: 64 MMHG | BODY MASS INDEX: 24.29 KG/M2 | WEIGHT: 141.5 LBS | HEART RATE: 68 BPM | SYSTOLIC BLOOD PRESSURE: 106 MMHG

## 2025-01-02 DIAGNOSIS — B95.1 POSITIVE GBS TEST: ICD-10-CM

## 2025-01-02 DIAGNOSIS — Z3A.36 36 WEEKS GESTATION OF PREGNANCY: ICD-10-CM

## 2025-01-02 DIAGNOSIS — O24.414 INSULIN CONTROLLED GESTATIONAL DIABETES MELLITUS (GDM) IN THIRD TRIMESTER: ICD-10-CM

## 2025-01-02 DIAGNOSIS — O09.93 HIGH-RISK PREGNANCY IN THIRD TRIMESTER: Primary | ICD-10-CM

## 2025-01-02 PROCEDURE — 0502F SUBSEQUENT PRENATAL CARE: CPT | Performed by: ADVANCED PRACTICE MIDWIFE

## 2025-01-02 ASSESSMENT — PATIENT HEALTH QUESTIONNAIRE - PHQ9
SUM OF ALL RESPONSES TO PHQ QUESTIONS 1-9: 0
2. FEELING DOWN, DEPRESSED OR HOPELESS: NOT AT ALL
SUM OF ALL RESPONSES TO PHQ QUESTIONS 1-9: 0
SUM OF ALL RESPONSES TO PHQ9 QUESTIONS 1 & 2: 0
1. LITTLE INTEREST OR PLEASURE IN DOING THINGS: NOT AT ALL

## 2025-01-07 ENCOUNTER — TELEPHONE (OUTPATIENT)
Age: 25
End: 2025-01-07

## 2025-01-07 ENCOUNTER — HOSPITAL ENCOUNTER (OUTPATIENT)
Age: 25
Discharge: HOME | End: 2025-01-07
Attending: OBSTETRICS & GYNECOLOGY | Admitting: OBSTETRICS & GYNECOLOGY
Payer: COMMERCIAL

## 2025-01-07 ENCOUNTER — HOSPITAL ENCOUNTER (OUTPATIENT)
Age: 25
Discharge: HOME | End: 2025-01-07
Payer: COMMERCIAL

## 2025-01-07 VITALS
HEART RATE: 77 BPM | SYSTOLIC BLOOD PRESSURE: 128 MMHG | TEMPERATURE: 97.7 F | RESPIRATION RATE: 16 BRPM | DIASTOLIC BLOOD PRESSURE: 80 MMHG

## 2025-01-07 DIAGNOSIS — R51.9 PREGNANCY HEADACHE IN THIRD TRIMESTER: ICD-10-CM

## 2025-01-07 DIAGNOSIS — O26.893 PREGNANCY HEADACHE IN THIRD TRIMESTER: Primary | ICD-10-CM

## 2025-01-07 DIAGNOSIS — O26.893 PREGNANCY HEADACHE IN THIRD TRIMESTER: ICD-10-CM

## 2025-01-07 DIAGNOSIS — R51.9 PREGNANCY HEADACHE IN THIRD TRIMESTER: Primary | ICD-10-CM

## 2025-01-07 PROBLEM — Z3A.37 37 WEEKS GESTATION OF PREGNANCY: Status: ACTIVE | Noted: 2025-01-07

## 2025-01-07 LAB
ALBUMIN SERPL-MCNC: 3.3 G/DL (ref 3.5–5.2)
ALBUMIN/GLOB SERPL: 1 {RATIO} (ref 1–2.5)
ALP SERPL-CCNC: 179 U/L (ref 35–104)
ALT SERPL-CCNC: 25 U/L (ref 10–35)
ANION GAP SERPL CALCULATED.3IONS-SCNC: 15 MMOL/L (ref 9–16)
AST SERPL-CCNC: 37 U/L (ref 10–35)
BASOPHILS # BLD: 0 K/UL (ref 0–0.2)
BASOPHILS NFR BLD: 0 % (ref 0–2)
BILIRUB SERPL-MCNC: 0.6 MG/DL (ref 0–1.2)
BUN SERPL-MCNC: 16 MG/DL (ref 6–20)
CALCIUM SERPL-MCNC: 8.6 MG/DL (ref 8.6–10.4)
CHLORIDE SERPL-SCNC: 101 MMOL/L (ref 98–107)
CO2 SERPL-SCNC: 19 MMOL/L (ref 20–31)
CREAT SERPL-MCNC: 0.8 MG/DL (ref 0.6–0.9)
CREAT UR-MCNC: 256 MG/DL (ref 28–217)
EOSINOPHIL # BLD: 0 K/UL (ref 0–0.4)
EOSINOPHILS RELATIVE PERCENT: 0 % (ref 1–4)
ERYTHROCYTE [DISTWIDTH] IN BLOOD BY AUTOMATED COUNT: 12.8 % (ref 11.8–14.4)
GFR, ESTIMATED: >90 ML/MIN/1.73M2
GLUCOSE SERPL-MCNC: 92 MG/DL (ref 74–99)
HCT VFR BLD AUTO: 38.8 % (ref 36.3–47.1)
HGB BLD-MCNC: 12.9 G/DL (ref 11.9–15.1)
IMM GRANULOCYTES # BLD AUTO: 0 K/UL (ref 0–0.3)
IMM GRANULOCYTES NFR BLD: 0 %
LYMPHOCYTES NFR BLD: 0.48 K/UL (ref 1–4.8)
LYMPHOCYTES RELATIVE PERCENT: 7 % (ref 24–44)
MCH RBC QN AUTO: 34.8 PG (ref 25.2–33.5)
MCHC RBC AUTO-ENTMCNC: 33.2 G/DL (ref 28.4–34.8)
MCV RBC AUTO: 104.6 FL (ref 82.6–102.9)
MONOCYTES NFR BLD: 0.41 K/UL (ref 0.1–0.8)
MONOCYTES NFR BLD: 6 % (ref 1–7)
MORPHOLOGY: NORMAL
NEUTROPHILS NFR BLD: 87 % (ref 36–66)
NEUTS SEG NFR BLD: 6.01 K/UL (ref 1.8–7.7)
NRBC BLD-RTO: 0 PER 100 WBC
PLATELET # BLD AUTO: 156 K/UL (ref 138–453)
PMV BLD AUTO: 10.1 FL (ref 8.1–13.5)
POTASSIUM SERPL-SCNC: 4 MMOL/L (ref 3.7–5.3)
PROT SERPL-MCNC: 6.6 G/DL (ref 6.6–8.7)
RBC # BLD AUTO: 3.71 M/UL (ref 3.95–5.11)
SODIUM SERPL-SCNC: 135 MMOL/L (ref 136–145)
TOTAL PROTEIN, URINE: 43 MG/DL
URINE TOTAL PROTEIN CREATININE RATIO: 0.17 (ref 0–0.2)
WBC OTHER # BLD: 6.9 K/UL (ref 3.5–11.3)

## 2025-01-07 PROCEDURE — 85025 COMPLETE CBC W/AUTO DIFF WBC: CPT

## 2025-01-07 PROCEDURE — 36415 COLL VENOUS BLD VENIPUNCTURE: CPT

## 2025-01-07 PROCEDURE — 80053 COMPREHEN METABOLIC PANEL: CPT

## 2025-01-07 PROCEDURE — 99213 OFFICE O/P EST LOW 20 MIN: CPT

## 2025-01-07 PROCEDURE — 84156 ASSAY OF PROTEIN URINE: CPT

## 2025-01-07 PROCEDURE — 82570 ASSAY OF URINE CREATININE: CPT

## 2025-01-07 RX ORDER — ONDANSETRON 4 MG/1
4 TABLET, ORALLY DISINTEGRATING ORAL EVERY 8 HOURS PRN
Status: DISCONTINUED | OUTPATIENT
Start: 2025-01-07 | End: 2025-01-07 | Stop reason: HOSPADM

## 2025-01-07 RX ORDER — ACETAMINOPHEN 500 MG
1000 TABLET ORAL EVERY 8 HOURS SCHEDULED
Status: DISCONTINUED | OUTPATIENT
Start: 2025-01-07 | End: 2025-01-07 | Stop reason: HOSPADM

## 2025-01-07 RX ORDER — ONDANSETRON 2 MG/ML
4 INJECTION INTRAMUSCULAR; INTRAVENOUS EVERY 6 HOURS PRN
Status: DISCONTINUED | OUTPATIENT
Start: 2025-01-07 | End: 2025-01-07 | Stop reason: HOSPADM

## 2025-01-07 NOTE — TELEPHONE ENCOUNTER
Patient is 38 wks, has an appointment on 1/8/25 @ 11am     Patient called office this morning stating BP is a little elevated, still in normal range but patient said BP runs low so it is chucky high for patient. Also patient states has headaches.    Patient wondering If she should be concerned.     Please Advise,     Thank you

## 2025-01-07 NOTE — PROGRESS NOTES
Malaika calls stating her headache is only a little better after Tylenol but now BP is 130s/88. Went down for labs about an hour ago. Instructed to report to L&D for eval and monitoring. Unit and oncoming CNM notified.

## 2025-01-07 NOTE — PROGRESS NOTES
Pt calls with \"terrible headache\" with normal BP but elevated for her, 120s/80s. Working at [address] currently and amenable to labs. Orders placed. +FM, denies LOF or vaginal bleeding, having some mild cramping.    Elvira Lyons, APRN-CNM

## 2025-01-07 NOTE — H&P
abnormalities, negative breast lumps, negative nipple discharge  Respiratory: negative dyspnea, negative cough, negative SOB  Cardiovascular: negative chest pain,  negative palpitations, negative arrhythmia, negative syncope   Gastrointestinal: negative abdominal pain, negative RUQ pain, negative N/V, negative diarrhea, negative constipation, negative bowel changes, negative heartburn   Genitourinary: negative dysuria, negative hematuria, negative urinary incontinence, negative vaginal discharge, negative vaginal bleeding or spotting  Dermatological: negative rash, negative pruritis, negative mole or other skin changes  Hematologic: negative bruising  Immunologic/Lymphatic: negative recent illness, negative recent sick contact  Musculoskeletal: negative back pain, negative myalgias, negative arthralgias  Neurological:  negative dizziness, negative migraines, negative seizures, negative weakness  Behavior/Psych: negative depression, negative anxiety, negative SI, negative HI    OBSTETRIC HISTORY:   OB History    Para Term  AB Living   2 1 1 0 0 1   SAB IAB Ectopic Molar Multiple Live Births   0 0 0 0 0 1      # Outcome Date GA Lbr Rashad/2nd Weight Sex Type Anes PTL Lv   2 Current            1 Term 23 40w3d 02:26 / 03:14 3.26 kg (7 lb 3 oz) M Vag-Vacuum EPI N BRENNON      Name: THEODORE THORPE      Apgar1: 8  Apgar5: 9      Obstetric Comments   [id number]       PAST MEDICAL HISTORY:   has a past medical history of Diet controlled gestational diabetes mellitus (GDM) in second trimester and Hypotension.    PAST SURGICAL HISTORY:   has a past surgical history that includes Foot surgery (Left).    ALLERGIES:  has No Known Allergies.    MEDICATIONS:  Prior to Admission medications    Medication Sig Start Date End Date Taking? Authorizing Provider   vitamin D (CHOLECALCIFEROL) 25 MCG (1000 UT) TABS tablet Take 1 tablet by mouth daily 24  Yes Jasmyn Henley APRN - CNM   HUMULIN N KWIKPEN 100

## 2025-01-08 ENCOUNTER — ROUTINE PRENATAL (OUTPATIENT)
Dept: PERINATAL CARE | Age: 25
End: 2025-01-08
Payer: COMMERCIAL

## 2025-01-08 ENCOUNTER — ROUTINE PRENATAL (OUTPATIENT)
Dept: OBGYN CLINIC | Age: 25
End: 2025-01-08

## 2025-01-08 VITALS
RESPIRATION RATE: 16 BRPM | DIASTOLIC BLOOD PRESSURE: 73 MMHG | WEIGHT: 142 LBS | BODY MASS INDEX: 24.24 KG/M2 | TEMPERATURE: 97.5 F | HEIGHT: 64 IN | SYSTOLIC BLOOD PRESSURE: 103 MMHG | HEART RATE: 89 BPM

## 2025-01-08 VITALS
DIASTOLIC BLOOD PRESSURE: 62 MMHG | BODY MASS INDEX: 24.24 KG/M2 | SYSTOLIC BLOOD PRESSURE: 114 MMHG | HEART RATE: 75 BPM | WEIGHT: 141.2 LBS

## 2025-01-08 DIAGNOSIS — R51.9 PREGNANCY HEADACHE IN THIRD TRIMESTER: ICD-10-CM

## 2025-01-08 DIAGNOSIS — O24.414 INSULIN CONTROLLED GESTATIONAL DIABETES MELLITUS (GDM) IN THIRD TRIMESTER: ICD-10-CM

## 2025-01-08 DIAGNOSIS — Z36.3 ENCOUNTER FOR ROUTINE SCREENING FOR MALFORMATION USING ULTRASONICS: ICD-10-CM

## 2025-01-08 DIAGNOSIS — Z3A.37 37 WEEKS GESTATION OF PREGNANCY: ICD-10-CM

## 2025-01-08 DIAGNOSIS — O09.93 HIGH-RISK PREGNANCY IN THIRD TRIMESTER: Primary | ICD-10-CM

## 2025-01-08 DIAGNOSIS — O26.893 PREGNANCY HEADACHE IN THIRD TRIMESTER: ICD-10-CM

## 2025-01-08 DIAGNOSIS — O09.899 SHORT INTERVAL BETWEEN PREGNANCIES COMPLICATING PREGNANCY, ANTEPARTUM: ICD-10-CM

## 2025-01-08 DIAGNOSIS — Z36.89 NST (NON-STRESS TEST) REACTIVE ON FETAL SURVEILLANCE: ICD-10-CM

## 2025-01-08 DIAGNOSIS — O24.414 INSULIN CONTROLLED GESTATIONAL DIABETES MELLITUS (GDM) DURING PREGNANCY, ANTEPARTUM: Primary | ICD-10-CM

## 2025-01-08 DIAGNOSIS — O35.07X0 FETAL MICROCEPHALY AFFECTING ANTEPARTUM CARE OF MOTHER, SINGLE OR UNSPECIFIED FETUS: ICD-10-CM

## 2025-01-08 DIAGNOSIS — O35.9XX0 FETAL ABNORMALITY AFFECTING MANAGEMENT OF MOTHER, SINGLE OR UNSPECIFIED FETUS: ICD-10-CM

## 2025-01-08 DIAGNOSIS — Z36.4 ULTRASOUND FOR ANTENATAL SCREENING FOR FETAL GROWTH RESTRICTION: ICD-10-CM

## 2025-01-08 DIAGNOSIS — O35.07X0 MICROCEPHALY OF FETUS: ICD-10-CM

## 2025-01-08 PROCEDURE — 99999 PR OFFICE/OUTPT VISIT,PROCEDURE ONLY: CPT | Performed by: OBSTETRICS & GYNECOLOGY

## 2025-01-08 PROCEDURE — 0502F SUBSEQUENT PRENATAL CARE: CPT | Performed by: ADVANCED PRACTICE MIDWIFE

## 2025-01-08 PROCEDURE — 76805 OB US >/= 14 WKS SNGL FETUS: CPT | Performed by: OBSTETRICS & GYNECOLOGY

## 2025-01-08 PROCEDURE — 76819 FETAL BIOPHYS PROFIL W/O NST: CPT | Performed by: OBSTETRICS & GYNECOLOGY

## 2025-01-08 ASSESSMENT — ANXIETY QUESTIONNAIRES
2. NOT BEING ABLE TO STOP OR CONTROL WORRYING: NOT AT ALL
5. BEING SO RESTLESS THAT IT IS HARD TO SIT STILL: NOT AT ALL
1. FEELING NERVOUS, ANXIOUS, OR ON EDGE: NOT AT ALL
3. WORRYING TOO MUCH ABOUT DIFFERENT THINGS: NOT AT ALL
4. TROUBLE RELAXING: NOT AT ALL
6. BECOMING EASILY ANNOYED OR IRRITABLE: NOT AT ALL
7. FEELING AFRAID AS IF SOMETHING AWFUL MIGHT HAPPEN: NOT AT ALL
GAD7 TOTAL SCORE: 0

## 2025-01-08 NOTE — PROGRESS NOTES
Pt is here today at her 37.4 pnv   Pt states fetal movement is good   Pt has no concerns, pt does still have her headache from yesterday.   Nst start time 11:21 am   
SUBJECTIVE:  Malaika is here for her routine OB visit & NST.   She reports fetal movement.  She denies vaginal bleeding.  She denies leaking of fluid.  She denies vaginal discharge.  She denies uterine contraction activity.  She denies nausea and/or vomiting.  She denies retaining fluid in her extremities  She denies, visual changes, epigastric discomfort, RUQ pain.     Malaika reports she is doing okay but woke up with a headache. Was evaluated at [facility] L&D triage yesterday evening for headache and had normal preE labs and normal BP. Upon leaving the unit her headache had mostly resolved, rating it 1-2/10.  Today she woke up and her headache was 4-5/10, feels the same as yesterday. Took Tylenol 1000 mg this AM around 0645 and it did not help. Ate breakfast, feels she is adequately hydrated.   Fastin BS this AM = 83.     OBJECTIVE:   Blood pressure 114/62, pulse 75, weight 64 kg (141 lb 3.2 oz), last menstrual period 2024, not currently breastfeeding.    Pregravid BMI: 20.59   TW.616 kg (21 lb 3.2 oz)    SVE:  2-3/60/-1, posterior, very soft - membranes swept per request   0 1 2 3 Patient    Dilation Closed 1-2 3-4 5-6 1    Effacement 0-30 40-50 60-70 >80 2    Station -3 -2 -1/0 +1/+2 2    Consistency Firm Med Soft  2    Position Post Mid Ant  0   TOTAL        7        Tdap - already received   Influenza vaccine - received at work   Rhogam was not indicated    NST:   Baseline:  120  Variability:  Moderate  Accelerations:  Present  Decelerations: Absent   Fetal Movement:  Perceived by patient during NST  Contractions: patient denies contractions, 1 contractions present on toco.  Interpretation: Reactive (Category1)  Time: 20 minutes     ASSESSMENT/PLAN:  IUP @ 37w4d   Malaika will monitor for fetal movements daily  [x]  GBS POSITIVE - PCN G in labor   [x]  28 week lab results were reviewed. Glucola: GDM, Hgb 12.2.   [x]  Signs of labor to report were and when to call midwives was discussed  [x]  Birth 
at all Not at all Not at all   Becoming easily annoyed or irritable Not at all Not at all Not at all   Feeling afraid as if something awful might happen Not at all Not at all Not at all   URSULA-7 Total Score 0 0 1   How difficult have these problems made it for you to do your work, take care of things at home, or get along with other people?   Not difficult at all

## 2025-01-08 NOTE — PROGRESS NOTES
Blood sugars reviewed (adequate glycemic control currently).   Insulin regimen: Continue NPH Insulin subcutaneously 14 units before bedtime.    Please continue to send blood glucose monitoring information to MFM office so that insulin and/or dietary changes can be made if necessary weekly.  Diabetic education/nutrition counseling advised.   Continue recommended ADA diet therapy for diabetes.   Compliance with regular prenatal care and blood sugar monitoring strongly encouraged.      Strongly advised patient to be compliant with blood sugar monitoring as previously advised to minimize the potential of adverse  outcomes (e.g. fetal demise/stillbirth, polyhydramnios/oligohydramnios, fetal growth abnormalities, pregnancy loss, hypertensive disorders of pregnancy, indicated  delivery, etc.), potential maternal morbidities, etc.     Patient previously declined non-invasive prenatal diagnosis testing (with [facility]).    Please refer to maternal carrier testing ([facility]'s Horizon Carrier Screen) completed in previous pregnancy.      Options with respect to offering the patient invasive genetic prenatal testing, non-invasive prenatal testing (NIPT/NIPS), and evaluation for various  infections could not be completed, as the patient again declined a Maternal-Fetal Medicine physician follow-up consultation.     Pediatric urology postpartum consultation advised on the /, given the fetal findings today.      Patient again declined a Maternal-Fetal Medicine complete physician follow-up consultation regarding the fetal and/or maternal medical/obstetrical complications/co-morbidities of pregnancy (specifically for previous finding of fetal microcephaly and new finding of hydroceles).      Maternal-Fetal Medicine (MFM) attending physician will defer all management for these medical/obstetrical complications of pregnancy to the primary attending obstetrical physician/provider, as a result.

## 2025-01-08 NOTE — PROGRESS NOTES
Obstetric/Gynecology Maternal Fetal Medicine Resident Note    Patient notified of bilateral fetal hydroceles seen on ultrasound today.     Patient declines formal consult with M attending physician for new finding of bilateral hydroceles and previously diagnosed microcephaly.     Ann Saha DO  OBGYN Resident, PGY2  [facility]  1/8/2025, 8:47 AM

## 2025-01-10 ENCOUNTER — TELEPHONE (OUTPATIENT)
Dept: OBGYN CLINIC | Age: 25
End: 2025-01-10

## 2025-01-11 NOTE — TELEPHONE ENCOUNTER
Patient calls saying she thinks her water might've broken. She states her and her partner had sex this afternoon and ever since she had sex she's been having some leaking.     CNM advised patient to put a pad on for a couple of hours and to check to see if it's dry or wet and call back with results.     Patient denies any vaginal bleeding or contraction.   She states she's feeling adequate fetal movement.

## 2025-01-12 SDOH — ECONOMIC STABILITY: INCOME INSECURITY: IN THE LAST 12 MONTHS, WAS THERE A TIME WHEN YOU WERE NOT ABLE TO PAY THE MORTGAGE OR RENT ON TIME?: NO

## 2025-01-12 SDOH — ECONOMIC STABILITY: FOOD INSECURITY: WITHIN THE PAST 12 MONTHS, YOU WORRIED THAT YOUR FOOD WOULD RUN OUT BEFORE YOU GOT MONEY TO BUY MORE.: NEVER TRUE

## 2025-01-12 SDOH — ECONOMIC STABILITY: FOOD INSECURITY: WITHIN THE PAST 12 MONTHS, THE FOOD YOU BOUGHT JUST DIDN'T LAST AND YOU DIDN'T HAVE MONEY TO GET MORE.: NEVER TRUE

## 2025-01-14 ENCOUNTER — PATIENT MESSAGE (OUTPATIENT)
Dept: PERINATAL CARE | Age: 25
End: 2025-01-14

## 2025-01-14 NOTE — TELEPHONE ENCOUNTER
Pt notified by phone to continue current diet and insulin regime.  Pt verbalized back without questions.

## 2025-01-15 ENCOUNTER — ROUTINE PRENATAL (OUTPATIENT)
Dept: OBGYN CLINIC | Age: 25
End: 2025-01-15

## 2025-01-15 DIAGNOSIS — O24.414 INSULIN CONTROLLED GESTATIONAL DIABETES MELLITUS (GDM) IN THIRD TRIMESTER: ICD-10-CM

## 2025-01-15 DIAGNOSIS — Z3A.38 38 WEEKS GESTATION OF PREGNANCY: Primary | ICD-10-CM

## 2025-01-15 DIAGNOSIS — B95.1 POSITIVE GBS TEST: ICD-10-CM

## 2025-01-15 DIAGNOSIS — O09.93 HIGH-RISK PREGNANCY IN THIRD TRIMESTER: ICD-10-CM

## 2025-01-15 PROCEDURE — 0502F SUBSEQUENT PRENATAL CARE: CPT

## 2025-01-15 NOTE — PROGRESS NOTES
GBS positive 12/26/24  Tdap given 11/25/24     
Pt is here today at her 38.4 PNV BPP, NST   Pt states fetal movement is good   Pt has no concerns  NST start time 1:55 pm   Pt would like her cervix check , strip  
reviewed and discussed.  []  Post-dates testing and protocol were discussed  []  Malaika was not counseled regarding Post Partum Depression and help  []  She has not decided on contraceptive choice.  [x]  Chart has been reviewed by collaborating physician.   S = D  Malaika was seen today for routine prenatal visit.    Diagnoses and all orders for this visit:    High-risk pregnancy in third trimester  -     FETAL NON-STRESS TEST  1/8/24 EFW 31% AC 48% BPD <3% HC 7% MADHAVI 15.49 cm, cephalic    Positive GBS test  PCN G in labor     Insulin controlled gestational diabetes mellitus (GDM) in third trimester  Fetal nonstress test; Future  Current treatment: 0/0/0/14  Taking ASA  Reviewed fetal kick counts   Continue tracking blood sugars 4x/day (fasting and 2 hour postprandial) and submit weekly to MFM - last sent 1/6/24  Growth U/S every 3-4 weeks  Last growth U/S today, see above  Fetal surveillance: NST weekly with CNM and BPP/UAD weekly with MFM at 32 weeks until delivery  IOL window:   GDM (on meds): 39-39+6/7 - already has scheduled 1/19/24 at 1200    [x]  Induction management was discussed and induction scheduled as above    Return for PP care.     Malaika was counseled regarding all of the above    The patient, Malaika Ca, was seen with a total time spent of 40 minutes for the visit on this date [facility]  The time component involved both face-to-face (counseling and education) and non face-to-face time (care coordination), spent in determining the total time component.     On this date January 15, 2025, I have spent greater than 50% of this visit:  [x] Reviewing previous notes/pre-charting  [x] Reviewing test results  [x] Performing necessary physical exam/evaluation  [] Ordering medications, tests, procedures  [] Placing referrals or communicating with other HCP  [x] Documenting and updating Problem List as necessary  [] Importance of compliance with treatment plan discussed  [x] Counseling

## 2025-01-19 ENCOUNTER — ANESTHESIA EVENT (OUTPATIENT)
Dept: LABOR AND DELIVERY | Age: 25
End: 2025-01-19
Payer: COMMERCIAL

## 2025-01-19 ENCOUNTER — HOSPITAL ENCOUNTER (INPATIENT)
Age: 25
LOS: 2 days | Discharge: HOME | End: 2025-01-21
Attending: STUDENT IN AN ORGANIZED HEALTH CARE EDUCATION/TRAINING PROGRAM | Admitting: STUDENT IN AN ORGANIZED HEALTH CARE EDUCATION/TRAINING PROGRAM
Payer: COMMERCIAL

## 2025-01-19 ENCOUNTER — ANESTHESIA (OUTPATIENT)
Dept: LABOR AND DELIVERY | Age: 25
End: 2025-01-19
Payer: COMMERCIAL

## 2025-01-19 ENCOUNTER — APPOINTMENT (OUTPATIENT)
Dept: LABOR AND DELIVERY | Age: 25
End: 2025-01-19
Payer: COMMERCIAL

## 2025-01-19 PROBLEM — Z34.90 ENCOUNTER FOR INDUCTION OF LABOR: Status: ACTIVE | Noted: 2025-01-19

## 2025-01-19 PROBLEM — Z3A.37 37 WEEKS GESTATION OF PREGNANCY: Status: RESOLVED | Noted: 2025-01-07 | Resolved: 2025-01-19

## 2025-01-19 LAB
ABO + RH BLD: NORMAL
AMPHET UR QL SCN: NEGATIVE
ARM BAND NUMBER: NORMAL
BARBITURATES UR QL SCN: NEGATIVE
BENZODIAZ UR QL: NEGATIVE
BLOOD BANK SAMPLE EXPIRATION: NORMAL
BLOOD GROUP ANTIBODIES SERPL: NEGATIVE
CANNABINOIDS UR QL SCN: NEGATIVE
CHP ED QC CHECK: NORMAL
COCAINE UR QL SCN: NEGATIVE
ERYTHROCYTE [DISTWIDTH] IN BLOOD BY AUTOMATED COUNT: 13 % (ref 11.8–14.4)
FENTANYL UR QL: NEGATIVE
GLUCOSE BLD-MCNC: 110 MG/DL (ref 65–105)
GLUCOSE BLD-MCNC: 86 MG/DL
HCT VFR BLD AUTO: 35.7 % (ref 36.3–47.1)
HGB BLD-MCNC: 12.5 G/DL (ref 11.9–15.1)
MCH RBC QN AUTO: 36.2 PG (ref 25.2–33.5)
MCHC RBC AUTO-ENTMCNC: 35 G/DL (ref 28.4–34.8)
MCV RBC AUTO: 103.5 FL (ref 82.6–102.9)
METHADONE UR QL: NEGATIVE
NRBC BLD-RTO: 0 PER 100 WBC
OPIATES UR QL SCN: NEGATIVE
OXYCODONE UR QL SCN: NEGATIVE
PCP UR QL SCN: NEGATIVE
PLATELET # BLD AUTO: 227 K/UL (ref 138–453)
PMV BLD AUTO: 11.3 FL (ref 8.1–13.5)
RBC # BLD AUTO: 3.45 M/UL (ref 3.95–5.11)
T PALLIDUM AB SER QL IA: NONREACTIVE
TEST INFORMATION: NORMAL
WBC OTHER # BLD: 9.5 K/UL (ref 3.5–11.3)

## 2025-01-19 PROCEDURE — 1220000000 HC SEMI PRIVATE OB R&B

## 2025-01-19 PROCEDURE — 2580000003 HC RX 258

## 2025-01-19 PROCEDURE — 82947 ASSAY GLUCOSE BLOOD QUANT: CPT

## 2025-01-19 PROCEDURE — 86901 BLOOD TYPING SEROLOGIC RH(D): CPT

## 2025-01-19 PROCEDURE — 2500000003 HC RX 250 WO HCPCS

## 2025-01-19 PROCEDURE — 6360000002 HC RX W HCPCS

## 2025-01-19 PROCEDURE — 80307 DRUG TEST PRSMV CHEM ANLYZR: CPT

## 2025-01-19 PROCEDURE — 86850 RBC ANTIBODY SCREEN: CPT

## 2025-01-19 PROCEDURE — 86780 TREPONEMA PALLIDUM: CPT

## 2025-01-19 PROCEDURE — 6360000002 HC RX W HCPCS: Performed by: REGISTERED NURSE

## 2025-01-19 PROCEDURE — 85027 COMPLETE CBC AUTOMATED: CPT

## 2025-01-19 PROCEDURE — 86900 BLOOD TYPING SEROLOGIC ABO: CPT

## 2025-01-19 PROCEDURE — 3700000025 EPIDURAL BLOCK: Performed by: ANESTHESIOLOGY

## 2025-01-19 PROCEDURE — 51701 INSERT BLADDER CATHETER: CPT

## 2025-01-19 PROCEDURE — 7200000001 HC VAGINAL DELIVERY

## 2025-01-19 RX ORDER — ONDANSETRON 2 MG/ML
4 INJECTION INTRAMUSCULAR; INTRAVENOUS EVERY 6 HOURS PRN
Status: DISCONTINUED | OUTPATIENT
Start: 2025-01-19 | End: 2025-01-20 | Stop reason: HOSPADM

## 2025-01-19 RX ORDER — SENNA AND DOCUSATE SODIUM 50; 8.6 MG/1; MG/1
2 TABLET, FILM COATED ORAL NIGHTLY
Status: DISCONTINUED | OUTPATIENT
Start: 2025-01-20 | End: 2025-01-21 | Stop reason: HOSPADM

## 2025-01-19 RX ORDER — INSULIN LISPRO 100 [IU]/ML
0-12 INJECTION, SOLUTION INTRAVENOUS; SUBCUTANEOUS 4 TIMES DAILY PRN
Status: DISCONTINUED | OUTPATIENT
Start: 2025-01-19 | End: 2025-01-20

## 2025-01-19 RX ORDER — SODIUM CHLORIDE 0.9 % (FLUSH) 0.9 %
5-40 SYRINGE (ML) INJECTION EVERY 12 HOURS SCHEDULED
Status: DISCONTINUED | OUTPATIENT
Start: 2025-01-20 | End: 2025-01-21 | Stop reason: HOSPADM

## 2025-01-19 RX ORDER — IBUPROFEN 600 MG/1
600 TABLET, FILM COATED ORAL EVERY 6 HOURS PRN
Qty: 30 TABLET | Refills: 1 | Status: SHIPPED | OUTPATIENT
Start: 2025-01-19

## 2025-01-19 RX ORDER — SODIUM CHLORIDE 9 MG/ML
INJECTION, SOLUTION INTRAVENOUS PRN
Status: DISCONTINUED | OUTPATIENT
Start: 2025-01-19 | End: 2025-01-21 | Stop reason: HOSPADM

## 2025-01-19 RX ORDER — NALOXONE HYDROCHLORIDE 0.4 MG/ML
INJECTION, SOLUTION INTRAMUSCULAR; INTRAVENOUS; SUBCUTANEOUS PRN
Status: DISCONTINUED | OUTPATIENT
Start: 2025-01-19 | End: 2025-01-20 | Stop reason: HOSPADM

## 2025-01-19 RX ORDER — LIDOCAINE HYDROCHLORIDE 10 MG/ML
INJECTION, SOLUTION EPIDURAL; INFILTRATION; INTRACAUDAL; PERINEURAL
Status: DISCONTINUED | OUTPATIENT
Start: 2025-01-19 | End: 2025-01-19 | Stop reason: SDUPTHER

## 2025-01-19 RX ORDER — ONDANSETRON 4 MG/1
4 TABLET, ORALLY DISINTEGRATING ORAL EVERY 6 HOURS PRN
Status: DISCONTINUED | OUTPATIENT
Start: 2025-01-19 | End: 2025-01-19 | Stop reason: SDUPTHER

## 2025-01-19 RX ORDER — SODIUM CHLORIDE 0.9 % (FLUSH) 0.9 %
5-40 SYRINGE (ML) INJECTION EVERY 12 HOURS SCHEDULED
Status: DISCONTINUED | OUTPATIENT
Start: 2025-01-19 | End: 2025-01-20 | Stop reason: HOSPADM

## 2025-01-19 RX ORDER — DEXTROSE MONOHYDRATE 100 MG/ML
INJECTION, SOLUTION INTRAVENOUS CONTINUOUS PRN
Status: DISCONTINUED | OUTPATIENT
Start: 2025-01-19 | End: 2025-01-20

## 2025-01-19 RX ORDER — ROPIVACAINE HYDROCHLORIDE 2 MG/ML
INJECTION, SOLUTION EPIDURAL; INFILTRATION; PERINEURAL
Status: DISCONTINUED | OUTPATIENT
Start: 2025-01-19 | End: 2025-01-19 | Stop reason: SDUPTHER

## 2025-01-19 RX ORDER — MISOPROSTOL 100 UG/1
400 TABLET ORAL PRN
Status: DISCONTINUED | OUTPATIENT
Start: 2025-01-19 | End: 2025-01-20

## 2025-01-19 RX ORDER — ONDANSETRON 2 MG/ML
4 INJECTION INTRAMUSCULAR; INTRAVENOUS EVERY 6 HOURS PRN
Status: DISCONTINUED | OUTPATIENT
Start: 2025-01-19 | End: 2025-01-21 | Stop reason: HOSPADM

## 2025-01-19 RX ORDER — LIDOCAINE HYDROCHLORIDE AND EPINEPHRINE 15; 5 MG/ML; UG/ML
INJECTION, SOLUTION EPIDURAL
Status: DISCONTINUED | OUTPATIENT
Start: 2025-01-19 | End: 2025-01-19 | Stop reason: SDUPTHER

## 2025-01-19 RX ORDER — DOCUSATE SODIUM 100 MG/1
100 CAPSULE, LIQUID FILLED ORAL 2 TIMES DAILY
Qty: 60 CAPSULE | Refills: 1 | Status: SHIPPED | OUTPATIENT
Start: 2025-01-19 | End: 2025-03-20

## 2025-01-19 RX ORDER — TERBUTALINE SULFATE 1 MG/ML
0.25 INJECTION, SOLUTION SUBCUTANEOUS
Status: DISCONTINUED | OUTPATIENT
Start: 2025-01-19 | End: 2025-01-20 | Stop reason: HOSPADM

## 2025-01-19 RX ORDER — ROPIVACAINE HYDROCHLORIDE 2 MG/ML
10 INJECTION, SOLUTION EPIDURAL; INFILTRATION; PERINEURAL CONTINUOUS
Status: DISCONTINUED | OUTPATIENT
Start: 2025-01-19 | End: 2025-01-20 | Stop reason: HOSPADM

## 2025-01-19 RX ORDER — GLUCAGON 1 MG/ML
1 KIT INJECTION PRN
Status: DISCONTINUED | OUTPATIENT
Start: 2025-01-19 | End: 2025-01-20

## 2025-01-19 RX ORDER — ACETAMINOPHEN 500 MG
1000 TABLET ORAL EVERY 6 HOURS SCHEDULED
Status: DISCONTINUED | OUTPATIENT
Start: 2025-01-20 | End: 2025-01-21 | Stop reason: HOSPADM

## 2025-01-19 RX ORDER — EPHEDRINE SULFATE/0.9% NACL/PF 25 MG/5 ML
5 SYRINGE (ML) INTRAVENOUS PRN
Status: DISCONTINUED | OUTPATIENT
Start: 2025-01-20 | End: 2025-01-20 | Stop reason: HOSPADM

## 2025-01-19 RX ORDER — IBUPROFEN 600 MG/1
600 TABLET, FILM COATED ORAL EVERY 6 HOURS SCHEDULED
Status: DISCONTINUED | OUTPATIENT
Start: 2025-01-20 | End: 2025-01-21 | Stop reason: HOSPADM

## 2025-01-19 RX ORDER — 0.9 % SODIUM CHLORIDE 0.9 %
500 INTRAVENOUS SOLUTION INTRAVENOUS PRN
Status: DISCONTINUED | OUTPATIENT
Start: 2025-01-19 | End: 2025-01-20

## 2025-01-19 RX ORDER — METHYLERGONOVINE MALEATE 0.2 MG/ML
200 INJECTION INTRAVENOUS PRN
Status: DISCONTINUED | OUTPATIENT
Start: 2025-01-19 | End: 2025-01-20

## 2025-01-19 RX ORDER — ONDANSETRON 2 MG/ML
4 INJECTION INTRAMUSCULAR; INTRAVENOUS EVERY 6 HOURS PRN
Status: DISCONTINUED | OUTPATIENT
Start: 2025-01-19 | End: 2025-01-19 | Stop reason: SDUPTHER

## 2025-01-19 RX ORDER — SODIUM CHLORIDE 0.9 % (FLUSH) 0.9 %
5-40 SYRINGE (ML) INJECTION PRN
Status: DISCONTINUED | OUTPATIENT
Start: 2025-01-19 | End: 2025-01-21 | Stop reason: HOSPADM

## 2025-01-19 RX ORDER — SODIUM CHLORIDE 9 MG/ML
INJECTION, SOLUTION INTRAVENOUS PRN
Status: DISCONTINUED | OUTPATIENT
Start: 2025-01-19 | End: 2025-01-20 | Stop reason: HOSPADM

## 2025-01-19 RX ORDER — TRANEXAMIC ACID 10 MG/ML
1000 INJECTION, SOLUTION INTRAVENOUS
Status: DISCONTINUED | OUTPATIENT
Start: 2025-01-19 | End: 2025-01-20

## 2025-01-19 RX ORDER — ROPIVACAINE HYDROCHLORIDE 2 MG/ML
INJECTION, SOLUTION EPIDURAL; INFILTRATION; PERINEURAL
Status: COMPLETED
Start: 2025-01-19 | End: 2025-01-19

## 2025-01-19 RX ORDER — EPHEDRINE SULFATE/0.9% NACL/PF 25 MG/5 ML
10 SYRINGE (ML) INTRAVENOUS
Status: DISCONTINUED | OUTPATIENT
Start: 2025-01-19 | End: 2025-01-20 | Stop reason: HOSPADM

## 2025-01-19 RX ORDER — CARBOPROST TROMETHAMINE 250 UG/ML
250 INJECTION, SOLUTION INTRAMUSCULAR PRN
Status: DISCONTINUED | OUTPATIENT
Start: 2025-01-19 | End: 2025-01-20

## 2025-01-19 RX ORDER — SODIUM CHLORIDE 0.9 % (FLUSH) 0.9 %
5-40 SYRINGE (ML) INJECTION PRN
Status: DISCONTINUED | OUTPATIENT
Start: 2025-01-19 | End: 2025-01-20 | Stop reason: HOSPADM

## 2025-01-19 RX ORDER — ONDANSETRON 4 MG/1
4 TABLET, ORALLY DISINTEGRATING ORAL EVERY 6 HOURS PRN
Status: DISCONTINUED | OUTPATIENT
Start: 2025-01-19 | End: 2025-01-21 | Stop reason: HOSPADM

## 2025-01-19 RX ADMIN — Medication 166.7 ML: at 23:41

## 2025-01-19 RX ADMIN — PENICILLIN G POTASSIUM 5 MILLION UNITS: 5000000 INJECTION, POWDER, FOR SOLUTION INTRAMUSCULAR; INTRAVENOUS at 13:34

## 2025-01-19 RX ADMIN — LIDOCAINE HYDROCHLORIDE,EPINEPHRINE BITARTRATE 3 ML: 15; .005 INJECTION, SOLUTION EPIDURAL; INFILTRATION; INTRACAUDAL; PERINEURAL at 18:55

## 2025-01-19 RX ADMIN — Medication 2.5 MILLION UNITS: at 17:33

## 2025-01-19 RX ADMIN — ROPIVACAINE HYDROCHLORIDE 8 ML/HR: 2 INJECTION, SOLUTION EPIDURAL; INFILTRATION at 19:03

## 2025-01-19 RX ADMIN — SODIUM CHLORIDE: 9 INJECTION, SOLUTION INTRAVENOUS at 13:26

## 2025-01-19 RX ADMIN — LIDOCAINE HYDROCHLORIDE,EPINEPHRINE BITARTRATE 2 ML: 15; .005 INJECTION, SOLUTION EPIDURAL; INFILTRATION; INTRACAUDAL; PERINEURAL at 18:59

## 2025-01-19 RX ADMIN — Medication 2.5 MILLION UNITS: at 21:20

## 2025-01-19 RX ADMIN — SODIUM CHLORIDE, PRESERVATIVE FREE 10 ML: 5 INJECTION INTRAVENOUS at 13:20

## 2025-01-19 RX ADMIN — ROPIVACAINE HYDROCHLORIDE 10 ML/HR: 2 INJECTION, SOLUTION EPIDURAL; INFILTRATION at 19:12

## 2025-01-19 RX ADMIN — Medication 1 MILLI-UNITS/MIN: at 14:05

## 2025-01-19 RX ADMIN — LIDOCAINE HYDROCHLORIDE 3 ML: 10 INJECTION, SOLUTION EPIDURAL; INFILTRATION; INTRACAUDAL; PERINEURAL at 18:51

## 2025-01-19 RX ADMIN — ROPIVACAINE HYDROCHLORIDE 10 ML/HR: 2 INJECTION, SOLUTION EPIDURAL; INFILTRATION; PERINEURAL at 19:12

## 2025-01-19 ASSESSMENT — PAIN SCALES - GENERAL: PAINLEVEL_OUTOF10: 0

## 2025-01-19 NOTE — FLOWSHEET NOTE
Pt presents to L and D, accompanied by her hsb, via ambulatory.  Pt here for scheduled induction.    Pt gowned and in bed, oriented to room and call light.  EFM explained and applied.   FHT's 128.   Elvira Lyons CNM, aware of admission.

## 2025-01-19 NOTE — CARE COORDINATION
ANTEPARTUM NOTE    Encounter for induction of labor [Z34.90]    Malaika was admitted to L&D on 1/19/25 for IOL @ 39w1d    OB GYN Provider: MMW    Will meet with patient after delivery to verify name/address/phone/insurance and discuss discharge planning.     Anticipate DC home 2 nights after vaginal delivery or 4 nights after C/S delivery as long as hemodynamically stable.

## 2025-01-19 NOTE — H&P
[facility] Obstetrics History and Physical  2025  1:13 PM    SUBJECTIVE:    CHIEF COMPLAINT:  induction of labor    HISTORY OF PRESENT ILLNESS:      The patient is a 24 y.o. female at 39w1d.    Evelina presents with a chief complaint as above and is being admitted for induction.    Course of pregnancy complicated by:     Patient Active Problem List   Diagnosis    VAVD 23 M Apg 8/9 Wt 7#3    High-risk pregnancy in third trimester    Low vitamin D level    Hypotension    Low-lying placenta- resolved    Abnormal glucose tolerance test (GTT) during pregnancy, antepartum    Insulin controlled gestational diabetes mellitus (GDM) in third trimester    RECEIVED Tdap    Microcephaly of fetus    Positive GBS test    Encounter for induction of labor     OBJECTIVE:     Estimated Due Date:   Estimated Date of Delivery: 25   Patient's last menstrual period was 2024.    Confirmed by:    PRENATAL LABS:    Blood Type/Rh: A pos  Antibody Screen: negative  Hemoglobin, Hematocrit, Platelets: 12.9 / 38.8 / 156  Rubella: immune  T. Pallidum, IgG: negative   Hepatitis B Surface Antigen: negative   HIV: negative   Sickle Cell Screen: not done  Gonorrhea: negative  Chlamydia: negative  Urine culture: negative    1 hour Glucose Tolerance Test: 188  3 hour Glucose Tolerance Test: Fastin; 1 hour: 183; 2 hour  144; 3 hour: 43    Group B Strep: positive  Cystic Fibrosis Screen: patient declined  First Trimester Screen: patient declined  MSAFP/Multiple Markers: patient declined  Non-Invasive Prenatal Testing: not available     Steroids:  no    PAST OB HISTORY:  OB History    Para Term  AB Living   2 1 1 0 0 1   SAB IAB Ectopic Molar Multiple Live Births   0 0 0 0 0 1      # Outcome Date GA Lbr Rashad/2nd Weight Sex Type Anes PTL Lv   2 Current            1 Term 23 40w3d 02:26 / 03:14 3.26 kg (7 lb 3 oz) M Vag-Vacuum EPI N BRENNON      Name: MAURILIO,BABY BOY EVELINA      Apgar1: 8

## 2025-01-19 NOTE — DISCHARGE SUMMARY
Obstetric Discharge Summary  [facility]    Patient Name: Malaika Ca  Patient : 2000  Primary Care Physician: Mickey Boucher MD  Admit Date: 2025    Principal Diagnosis: IUP at 39w1d, admitted for Induction of Labor     Her pregnancy has been complicated by:   Patient Active Problem List   Diagnosis    VAVD 23 M Apg 8/9 Wt 7#3    High-risk pregnancy in third trimester    Low vitamin D level    Hypotension    Low-lying placenta- resolved    Abnormal glucose tolerance test (GTT) during pregnancy, antepartum    Insulin controlled gestational diabetes mellitus (GDM) in third trimester    RECEIVED Tdap    Microcephaly of fetus    Positive GBS test    Encounter for induction of labor       Infection Present?: No  Hospital Acquired: No    Surgical Operations & Procedures:  [x] Pitocin Induction of Labor  [] Pitocin Augmentation of Labor  [] Prostaglandin Induction of Labor  [] Cytotec (Misoprostol)  [] Mechanical Induction of Labor  [x] Artificial Rupture of Membranes  [] Intrauterine Pressure Catheter  [] Fetal Scalp Electrode  [] Amnioinfusion    Analgesia: epidural  Delivery Type: Spontaneous Vaginal Delivery: See Labor and Delivery Summary   Laceration(s): see L&D summary    Consultations: Anesthesia    Pertinent Findings & Procedures:   Malaika Ca is a 24 y.o. female  at 39w1d admitted for induction; received as above. She delivered by spontaneous vaginal a Live Born      Information for the patient's :  Harshal Ca Pending Malaika [[id number]]      Birth Weight: N/A    Apgars:   Information for the patient's :  Roby Baby Pending Malaika [[id number]]        Postpartum course: normal.      Course of patient: uncomplicated    Discharge to: Home    Readmission planned: no     Recommendations on Discharge:     Medications:        Medication List        CONTINUE taking these medications      Alcohol Swabs Pads  1 Box by Does not apply route 4 times

## 2025-01-19 NOTE — FLOWSHEET NOTE
1840,   Estefania Alejandro CRNA, at bedside.  Epidural procedure explained, risks discussed.  Pt verbalizes consent for epidural.   1844patient positioned for epidural.1845 Time out completed.   1855 catheter placed. 1855 test dose given.  Epidural catheter taped and secured per anesthesia.   1858 to low fowlers with left uterine displacement. 1901 loading dose given. 1902 pump initiated.  Pt tolerated procedure well.1840

## 2025-01-19 NOTE — ANESTHESIA PRE PROCEDURE
Encounter for induction of labor Z34.90       Past Medical History:        Diagnosis Date    Diet controlled gestational diabetes mellitus (GDM) in second trimester 10/30/2024    Hypotension        Past Surgical History:        Procedure Laterality Date    FOOT SURGERY Left        Social History:    Social History     Tobacco Use    Smoking status: Never    Smokeless tobacco: Never   Substance Use Topics    Alcohol use: Never                                Counseling given: Not Answered      Vital Signs (Current):   Vitals:    01/19/25 1500 01/19/25 1600 01/19/25 1700 01/19/25 1800   BP: 120/66 114/71 (!) 103/56 118/63   Pulse: 66 93 71 72   Resp: 17 16 17 16   Temp:  36.7 °C (98 °F)     TempSrc:  Oral     SpO2:                                                  BP Readings from Last 3 Encounters:   01/19/25 118/63   01/15/25 104/62   01/08/25 114/62       NPO Status:                                                                                 BMI:   Wt Readings from Last 3 Encounters:   01/15/25 64.1 kg (141 lb 6.4 oz)   01/08/25 64 kg (141 lb 3.2 oz)   01/08/25 64.4 kg (142 lb)     There is no height or weight on file to calculate BMI.    CBC:   Lab Results   Component Value Date/Time    WBC 9.5 01/19/2025 01:49 PM    RBC 3.45 01/19/2025 01:49 PM    HGB 12.5 01/19/2025 01:49 PM    HCT 35.7 01/19/2025 01:49 PM    .5 01/19/2025 01:49 PM    RDW 13.0 01/19/2025 01:49 PM     01/19/2025 01:49 PM       CMP:   Lab Results   Component Value Date/Time     01/07/2025 04:07 PM    K 4.0 01/07/2025 04:07 PM     01/07/2025 04:07 PM    CO2 19 01/07/2025 04:07 PM    BUN 16 01/07/2025 04:07 PM    CREATININE 0.8 01/07/2025 04:07 PM    LABGLOM >90 01/07/2025 04:07 PM    GLUCOSE 92 01/07/2025 04:07 PM    CALCIUM 8.6 01/07/2025 04:07 PM    BILITOT 0.6 01/07/2025 04:07 PM    ALKPHOS 179 01/07/2025 04:07 PM    AST 37 01/07/2025 04:07 PM    ALT 25 01/07/2025 04:07 PM       POC Tests:   Recent Labs

## 2025-01-20 LAB — GLUCOSE BLD-MCNC: 98 MG/DL (ref 65–105)

## 2025-01-20 PROCEDURE — 1220000000 HC SEMI PRIVATE OB R&B

## 2025-01-20 PROCEDURE — 2500000003 HC RX 250 WO HCPCS

## 2025-01-20 PROCEDURE — 6370000000 HC RX 637 (ALT 250 FOR IP)

## 2025-01-20 PROCEDURE — 10907ZC DRAINAGE OF AMNIOTIC FLUID, THERAPEUTIC FROM PRODUCTS OF CONCEPTION, VIA NATURAL OR ARTIFICIAL OPENING: ICD-10-PCS

## 2025-01-20 PROCEDURE — 82947 ASSAY GLUCOSE BLOOD QUANT: CPT

## 2025-01-20 PROCEDURE — 59400 OBSTETRICAL CARE: CPT

## 2025-01-20 PROCEDURE — 3E033VJ INTRODUCTION OF OTHER HORMONE INTO PERIPHERAL VEIN, PERCUTANEOUS APPROACH: ICD-10-PCS

## 2025-01-20 RX ADMIN — ACETAMINOPHEN 1000 MG: 500 TABLET, FILM COATED ORAL at 21:30

## 2025-01-20 RX ADMIN — IBUPROFEN 600 MG: 600 TABLET, FILM COATED ORAL at 07:20

## 2025-01-20 RX ADMIN — ACETAMINOPHEN 1000 MG: 500 TABLET, FILM COATED ORAL at 09:37

## 2025-01-20 RX ADMIN — IBUPROFEN 600 MG: 600 TABLET, FILM COATED ORAL at 20:27

## 2025-01-20 RX ADMIN — SODIUM CHLORIDE, PRESERVATIVE FREE 10 ML: 5 INJECTION INTRAVENOUS at 20:28

## 2025-01-20 RX ADMIN — BENZOCAINE AND LEVOMENTHOL: 200; 5 SPRAY TOPICAL at 00:42

## 2025-01-20 RX ADMIN — IBUPROFEN 600 MG: 600 TABLET, FILM COATED ORAL at 14:12

## 2025-01-20 RX ADMIN — IBUPROFEN 600 MG: 600 TABLET, FILM COATED ORAL at 00:40

## 2025-01-20 RX ADMIN — SENNOSIDES AND DOCUSATE SODIUM 2 TABLET: 50; 8.6 TABLET ORAL at 00:40

## 2025-01-20 RX ADMIN — ACETAMINOPHEN 1000 MG: 500 TABLET, FILM COATED ORAL at 15:33

## 2025-01-20 RX ADMIN — ACETAMINOPHEN 1000 MG: 500 TABLET, FILM COATED ORAL at 02:31

## 2025-01-20 RX ADMIN — SENNOSIDES AND DOCUSATE SODIUM 2 TABLET: 50; 8.6 TABLET ORAL at 20:27

## 2025-01-20 RX ADMIN — SODIUM CHLORIDE, PRESERVATIVE FREE 10 ML: 5 INJECTION INTRAVENOUS at 09:04

## 2025-01-20 ASSESSMENT — PAIN DESCRIPTION - DESCRIPTORS
DESCRIPTORS: SHOOTING;SHARP
DESCRIPTORS: CRAMPING

## 2025-01-20 ASSESSMENT — PAIN DESCRIPTION - LOCATION
LOCATION: PERINEUM
LOCATION: ABDOMEN
LOCATION: BACK
LOCATION: BACK

## 2025-01-20 ASSESSMENT — PAIN SCALES - GENERAL
PAINLEVEL_OUTOF10: 6
PAINLEVEL_OUTOF10: 5
PAINLEVEL_OUTOF10: 4
PAINLEVEL_OUTOF10: 2
PAINLEVEL_OUTOF10: 1

## 2025-01-20 NOTE — PLAN OF CARE
Problem: Chronic Conditions and Co-morbidities  Goal: Patient's chronic conditions and co-morbidity symptoms are monitored and maintained or improved  2025 by Haylee Stover RN  Outcome: Progressing     Problem: ABCDS Injury Assessment  Goal: Absence of physical injury  2025 by Haylee Stover RN  Outcome: Progressing     Problem: Pain  Goal: Verbalizes/displays adequate comfort level or baseline comfort level  2025 by Haylee Stover RN  Outcome: Progressing     Problem: Postpartum  Goal: Experiences normal postpartum course  Description:  Postpartum OB-Pregnancy care plan goal which identifies if the mother is experiencing a normal postpartum course  Outcome: Progressing     Problem: Postpartum  Goal: Appropriate maternal -  bonding  Description:  Postpartum OB-Pregnancy care plan goal which identifies if the mother and  are bonding appropriately  Outcome: Progressing     Problem: Postpartum  Goal: Establishment of infant feeding pattern  Description:  Postpartum OB-Pregnancy care plan goal which identifies if the mother is establishing a feeding pattern with their   Outcome: Progressing     Problem: Postpartum  Goal: Incisions, wounds, or drain sites healing without S/S of infection  Outcome: Progressing     Problem: Safety - Adult  Goal: Free from fall injury  Outcome: Progressing     Problem: Discharge Planning  Goal: Discharge to home or other facility with appropriate resources  Outcome: Progressing

## 2025-01-20 NOTE — FLOWSHEET NOTE
Patient transferred to to  Room 748 per wheelchair, infant in mother's arms.  Bedside report given to Mendez LEVINE.  Fundal height verified.  Patient had straight cath just prior to delivery and unable to void at this time.

## 2025-01-20 NOTE — CONSULTS
Lactation round made. Patient states baby has a shallow latch. Patient states 18 month old son also had a shallow latch and had to exclusively pump. Will do oral assessment when baby is due to nurse next. BF packet given. Encouraged to call out.

## 2025-01-20 NOTE — LACTATION NOTE
Lactation at bedside per request to help with breastfeeding. Patient states it is a shallow latch. Baby sleepy at breast after getting circumcised. Large drops of colostrum given to baby but baby does not wake to latch. Instructed patient to do STS and lactation will round in an hour.

## 2025-01-20 NOTE — CARE COORDINATION
CASE MANAGEMENT POST-PARTUM TRANSITIONAL CARE PLAN    Encounter for induction of labor [Z34.90]    OB Provider: MMW    Writer met w/ Malaika and her  Abiel at her bedside to discuss DCP. She is S/P  on 25 @ 39w1d at 2133 of male infant    Writer verified address,her phone number and emergency contacts are all correct on facesheet.. She states she lives with her  and their other son. She denied barriers with transportation home, to doctor's appointments or with paying for medications upon discharge home.     [facility] BS insurance correct. Writer notified them they have 30 days from date of birth to add  to insurance policy and will need to add via Work Day. They verbalized understanding.    Infant name on BC: [place].   Infant PCP Dr. Peña.     DME: GDM supplies, insulin  HOME CARE: None    Anticipate DC home of couplet in private vehicle in 1-2 days status post vaginal delivery.    BS RISK OF UNPLANNED READMISSION 2.0             3 Total Score

## 2025-01-20 NOTE — PROGRESS NOTES
CLINICAL PHARMACY NOTE: MEDS TO BEDS    Total # of Prescriptions Filled: 2   The following medications were delivered to the patient:  Colace 100mg  Ibuprofen 600mg    Additional Documentation: delivered by Татьяна 1/20 at 11:24am. Co-pay $12.48 [employer].

## 2025-01-20 NOTE — PLAN OF CARE
Problem: Chronic Conditions and Co-morbidities  Goal: Patient's chronic conditions and co-morbidity symptoms are monitored and maintained or improved  Outcome: Progressing     Problem: ABCDS Injury Assessment  Goal: Absence of physical injury  Outcome: Progressing     Problem: Pain  Goal: Verbalizes/displays adequate comfort level or baseline comfort level  Outcome: Progressing

## 2025-01-20 NOTE — PROGRESS NOTES
[facility] Labor Note    SUBJECTIVE:    Patient is working well with labor. Comfort measures include epidural in place and infusing, assisting with relief. Support system helpful.     OBJECTIVE:     VS:  oral temperature is 97.8 °F (36.6 °C). Her blood pressure is 115/66 and her pulse is 74. Her respiration is 16 and oxygen saturation is 99%.     FHT:    Baseline: 125   Variability: moderate              Accels: present   Decels: Absent    Scalp electrode: No    Contraction pattern:                                  Frequency: 2-4                                 Duration: 60-90                                 Intensity: Firm                                 Pitocin: Yes                                 IUPC:  No    Cervix:             Dilation: 6            Effacement: 100            Station: 0            Position: Mid            Consistency: Soft     Status of membranes: AROM @ 1938    Pain control: Epidural in place and infusing     Maternal status (hydration, fatigue, voids): WNL      ASSESSMENT/PLAN:  Malaika Ca is a 24 y.o. female   At 39w1d, induction of labor for GDM on insulin   - GBS +, Pen G for prophylaxis   - cEFM/[place]  - IV LR @ 125ml/hr   - anticipate   - IOL Method: Pitocin infusing   - epidural infusing   - Diet: clears  - POCT glucose now every 2 hours   - FHR Category 2, variables noted   - Coping well with labor  - Continue support

## 2025-01-20 NOTE — L&D DELIVERY NOTE
Information      Delivery Blood Loss   Intrapartum & Postpartum: 25 - 25 0553    Delivery Admission: 25 1203 - 25 0553         Intrapartum & Postpartum Delivery Admission    Quantitative Blood Loss (mL) Hospital Encounter 150 grams 150 grams    Total  150 mL 150 mL               End of Mother's Information  Mother: Malaika Ca #[id number]                Delivery Providers    Delivering clinician: Joselyn Womack APRN - CNM     Provider Role    Sharyn Cody RN Primary Nurse    Deepa Rodriguez RN Primary [facility] Nurse    Joselyn Womack APRN - CNM Midwife    Jenna Lockwood DO Resident               Assessment    Living Status: Living        Skin Color:   Heart Rate:   Reflex Irritability:   Muscle Tone:   Respiratory Effort:   Total:            1 Minute:    0    2    2    2    2    8         5 Minute:    1    2    2    2    2    9                                        Apgars Assigned By: RAÚL CODY RN              Resuscitation    Method: Stimulation             [facility] Measurements      Birth Weight: 3060 g   Birth Length: 50.8 cm     Head Circumference: 33 cm              Skin to Skin      Skin to Skin Initiation Date/Time: 25 21:40:00 EST     Skin to Skin With: Mother                  [facility] Vaginal Delivery Summary          Information for the patient's :  Jeff Ca Malaika [[id number]]        Pre-operative Diagnosis:  Term pregnancy and Induced labor    Post-operative Diagnosis:  Same, delivered    Procedure:  Spontaneous vaginal delivery    Provider:   ROLY Costa      Information for the patient's :  Jeff Ca [[id number]]        Anesthesia:  epidural anesthesia    Estimated blood loss:  see QBL flowsheets    Specimen:  Placenta sent to pathology     Cord blood sent Yes    Complications:  nucal cord    Condition:  infant stable to general nursery and mother stable    Details of Procedure:   The patient is a 24

## 2025-01-20 NOTE — FLOWSHEET NOTE
Patient admitted to room 748 from labor a dn delivery  via wheelchair.   Oriented to room and surroundings.  Plan of care reviewed.  Verbalized understanding.  Instructed on infant security and safe sleep practices.  Preventing falls education provided .The following handouts given: A New Beginning: Your Guide to Postpartum Care, Rounding, gs Security System,Babies Cry A lot, Safe Sleep, Security and Visitation Guidelines.   Call light placed within reach.

## 2025-01-20 NOTE — CARE COORDINATION
Social Work     Sw reviewed medical record (current active problem list) and tox screens and found no current concerns.     Sw spoke with mom and dad briefly to explain Sw role, inquire if any needs or concerns, and provide safe sleep education and discuss.  Mom denied any needs or questions and informs baby has a safe sleep environment (crib and bass).     Mom denied any current s/s of anxiety or depression and is aware to reach out to OB if any s/s occur after dc.     Mom reports a good support system and denied any current questions or needs.      Mom reports this is her 2nd child, they also have an 18 mo old son.       Mom states ped will be Dr. Peña at [facility].      Sw encouraged parents to reach out if any issues or concerns arise.

## 2025-01-20 NOTE — ANESTHESIA PROCEDURE NOTES
Epidural Block    Patient location during procedure: OB  Reason for block: labor epidural  Staffing  Performed: resident/CRNA   Resident/CRNA: Estefania Alejandro APRN - CRNA  Performed by: Estefania Alejandro APRN - CRNA  Authorized by: Brice Ugalde MD    Epidural  Patient position: sitting  Prep: Betadine  Patient monitoring: continuous pulse ox and frequent blood pressure checks  Approach: midline  Location: L3-4  Injection technique: KARAN saline  Provider prep: mask and sterile gloves  Needle  Needle type: Tuohy   Needle gauge: 17 G  Needle length: 3.5 in  Needle insertion depth: 5.5 cm  Catheter type: side hole  Catheter size: 19 G  Catheter at skin depth: 12 cm  Test dose: negativeCatheter Secured: tegaderm and tape  Assessment  Hemodynamics: stable  Attempts: 1  Outcomes: uncomplicated and patient tolerated procedure well  Preanesthetic Checklist  Completed: patient identified, IV checked, risks and benefits discussed, surgical/procedural consents, equipment checked, pre-op evaluation, timeout performed, anesthesia consent given, oxygen available, monitors applied/VS acknowledged, fire risk safety assessment completed and verbalized and blood product R/B/A discussed and consented

## 2025-01-21 ENCOUNTER — TELEPHONE (OUTPATIENT)
Dept: OBGYN CLINIC | Age: 25
End: 2025-01-21

## 2025-01-21 VITALS
OXYGEN SATURATION: 100 % | TEMPERATURE: 97.6 F | DIASTOLIC BLOOD PRESSURE: 84 MMHG | RESPIRATION RATE: 18 BRPM | HEART RATE: 87 BPM | SYSTOLIC BLOOD PRESSURE: 127 MMHG

## 2025-01-21 PROCEDURE — 6370000000 HC RX 637 (ALT 250 FOR IP)

## 2025-01-21 RX ADMIN — ACETAMINOPHEN 1000 MG: 500 TABLET, FILM COATED ORAL at 08:59

## 2025-01-21 RX ADMIN — IBUPROFEN 600 MG: 600 TABLET, FILM COATED ORAL at 08:59

## 2025-01-21 RX ADMIN — IBUPROFEN 600 MG: 600 TABLET, FILM COATED ORAL at 03:25

## 2025-01-21 ASSESSMENT — PAIN SCALES - GENERAL
PAINLEVEL_OUTOF10: 2
PAINLEVEL_OUTOF10: 2

## 2025-01-21 ASSESSMENT — PAIN - FUNCTIONAL ASSESSMENT: PAIN_FUNCTIONAL_ASSESSMENT: ACTIVITIES ARE NOT PREVENTED

## 2025-01-21 ASSESSMENT — PAIN DESCRIPTION - DESCRIPTORS: DESCRIPTORS: CRAMPING;DISCOMFORT

## 2025-01-21 ASSESSMENT — PAIN DESCRIPTION - ORIENTATION: ORIENTATION: LOWER

## 2025-01-21 ASSESSMENT — PAIN DESCRIPTION - LOCATION: LOCATION: ABDOMEN

## 2025-01-21 NOTE — ANESTHESIA POSTPROCEDURE EVALUATION
Department of Anesthesiology  Postprocedure Note    Patient: Malaika Ca  MRN: [id number]  YOB: 2000  Date of evaluation: 1/21/2025    Procedure Summary       Date: 01/19/25 Room / Location:     Anesthesia Start: 1842 Anesthesia Stop: 2133    Procedure: Labor Analgesia Diagnosis:     Scheduled Providers:  Responsible Provider: Brice Ugalde MD    Anesthesia Type: epidural ASA Status: 2            Anesthesia Type: No value filed.    Faith Phase I:      Faith Phase II:      Anesthesia Post Evaluation    Patient location during evaluation: PACU  Patient participation: complete - patient participated  Level of consciousness: awake and alert  Airway patency: patent  Nausea & Vomiting: no nausea and no vomiting  Cardiovascular status: blood pressure returned to baseline  Respiratory status: acceptable  Hydration status: euvolemic  Pain management: adequate    No notable events documented.

## 2025-01-21 NOTE — TELEPHONE ENCOUNTER
----- Message from Aida BURGER sent at 1/21/2025 12:11 PM EST -----  Hi! She needs 2 and 6 week PP visits. Thank you!

## 2025-01-21 NOTE — DISCHARGE INSTRUCTIONS
infant.      BLEEDING  Vaginal bleeding will decrease in amount over the next few weeks.  You will notice that as your activity increases, your flow may increase.  This is your body's way of telling you, you need take things easier and rest more often.  Call your OB/ER if you are saturating one maxi pad in an hour & passing large clots.      BREAST CARE  Take medications as recommended by your doctor or midwife for pain  If you develop a warm, red, tender area on your breast or develop a fever contact your lactation consultant. [phone].      For breastfeeding moms:  If you become engorged, feeding may be more difficult or painful for 1-2 days.  You may find it helpful to hand express some milk so that the infant can latch on more easily.   While breastfeeding, continue to take your prenatal vitamins as directed by your doctor or midwife.   Refer to the breastfeeding booklet in the  folder/binder for more information.  For any questions or concerns contact a Lactation Consultant.  Leave a message and your call will be returned.         RONY CARE  Shower daily, perineum with mild soap.  Use the rony-bottle until bleeding stops each time you use the restroom.  Cleanse your perineum from front to back.  If used, stitches will dissolve in 4-6 weeks.  You may use a sitz bath or soak in a clean tub with drain open and water running for comfort.  Kegel exercises will help restore bladder control.     SWELLING  Try to keep your legs elevated when you are sitting.   When lying down keep your legs elevated.    CALL THE DOCTOR WITH THESE HEALTH CONCERNS OR PROBLEMS  If you have a temp of 100.4or more.   If your bleeding has increased and you are saturating a pad in an hour.  Your abdomen is tender to touch.  You are passing blood clots bigger than the size of a lemon.  If you are experiencing extreme weakness or dizziness.   If you are having flu-like symptoms such as achy muscles or joints.  There is a foul smell

## 2025-01-21 NOTE — PLAN OF CARE
Problem: Chronic Conditions and Co-morbidities  Goal: Patient's chronic conditions and co-morbidity symptoms are monitored and maintained or improved  2025 0953 by Griselda Rios RN  Outcome: Completed  2025 by Mamta Wallis RN  Outcome: Progressing     Problem: ABCDS Injury Assessment  Goal: Absence of physical injury  2025 0953 by Griselda Rios RN  Outcome: Completed  2025 by Mamta Wallis RN  Outcome: Progressing     Problem: Pain  Goal: Verbalizes/displays adequate comfort level or baseline comfort level  2025 0953 by Griselda Rios RN  Outcome: Completed  2025 by Mamta Wallis RN  Outcome: Progressing     Problem: Postpartum  Goal: Experiences normal postpartum course  Description:  Postpartum OB-Pregnancy care plan goal which identifies if the mother is experiencing a normal postpartum course  2025 0953 by Griselda Rios RN  Outcome: Completed  2025 by Mamta Wallis RN  Outcome: Progressing  Goal: Appropriate maternal -  bonding  Description:  Postpartum OB-Pregnancy care plan goal which identifies if the mother and  are bonding appropriately  2025 0953 by Griselda Rios RN  Outcome: Completed  2025 by Mamta Wallis RN  Outcome: Progressing  Goal: Establishment of infant feeding pattern  Description:  Postpartum OB-Pregnancy care plan goal which identifies if the mother is establishing a feeding pattern with their   2025 0953 by Griselda Rios RN  Outcome: Completed  2025 by Mamta Wallis RN  Outcome: Progressing  Goal: Incisions, wounds, or drain sites healing without S/S of infection  2025 0953 by Griselda Rios RN  Outcome: Completed  2025 by Mamta Wallis RN  Outcome: Progressing     Problem: Infection - Adult  Goal: Absence of infection at discharge  2025 0953 by Griselda Rios RN  Outcome: Completed  2025

## 2025-01-21 NOTE — LACTATION NOTE
Slightly refined baby's position at the left breast in cross cradle hold. Mom noted the latch was painful. Took baby off breast and assisted with a deeper latch. Lew baby's upper lip out. Mom reported it felt better after a few sucks. Reviewed breastfeeding discharge information including feeding patterns, how to know baby is getting to the supply well, and comfort measures for engorgement. Encouraged her to call for an LC appointment as needed.

## 2025-01-21 NOTE — LACTATION NOTE
Mom reports her baby last fed 4 hours earlier. Attempted to wake baby for a feeding. Baby not alerting. Encouraged mom to try skin to skin and to watch baby for a light sleep phase. Mom to call out when baby alerts for a feeding.

## 2025-01-21 NOTE — PROGRESS NOTES
This RN went in to check FBS pt stated that she checked her sugar herself prior to eating a snack at about 0500 and it was 82, called to verify with resident.  Electronically signed by Mamta Wallis RN on 1/21/2025 at 6:31 AM

## 2025-01-21 NOTE — FLOWSHEET NOTE
Pt discharged to private residence via ambulatory (declined need for wheelchair) in stable condition with belongings  Discharge instructions given  \"Meds To Beds\" medication at bedside  Pt denies having any further questions at this time  Patient/family state they have everything they were admitted with.

## 2025-01-21 NOTE — PLAN OF CARE
Problem: Chronic Conditions and Co-morbidities  Goal: Patient's chronic conditions and co-morbidity symptoms are monitored and maintained or improved  2025 by Mamta Wallis RN  Outcome: Progressing  2025 by Haylee Stover RN  Outcome: Progressing  2025 by Melva Meade RN  Outcome: Progressing     Problem: ABCDS Injury Assessment  Goal: Absence of physical injury  2025 by Mamta Wallis RN  Outcome: Progressing  2025 by Haylee Stover RN  Outcome: Progressing  2025 06 by Melva Meade RN  Outcome: Progressing     Problem: Pain  Goal: Verbalizes/displays adequate comfort level or baseline comfort level  2025 by Mamta Wallis RN  Outcome: Progressing  2025 by Haylee Stover RN  Outcome: Progressing  2025 by Melva Meade RN  Outcome: Progressing     Problem: Postpartum  Goal: Experiences normal postpartum course  Description:  Postpartum OB-Pregnancy care plan goal which identifies if the mother is experiencing a normal postpartum course  2025 by Mamta Wallis RN  Outcome: Progressing  2025 by Haylee Stover RN  Outcome: Progressing  Goal: Appropriate maternal -  bonding  Description:  Postpartum OB-Pregnancy care plan goal which identifies if the mother and  are bonding appropriately  2025 by Mamta Wallis RN  Outcome: Progressing  2025 by Haylee Stover RN  Outcome: Progressing  Goal: Establishment of infant feeding pattern  Description:  Postpartum OB-Pregnancy care plan goal which identifies if the mother is establishing a feeding pattern with their   2025 by Mamta Wallis RN  Outcome: Progressing  2025 by Haylee Stover RN  Outcome: Progressing  Goal: Incisions, wounds, or drain sites healing without S/S of infection  2025 by Mamta Wallis RN  Outcome:

## 2025-01-21 NOTE — PROGRESS NOTES
[facility]   Vaginal Postpartum Note  Hospital Day: 3  Postpartum Day: 1      SUBJECTIVE:  Voices no concerns today. Doing well. Voiding, ambulating, eating without difficulty. Denies any leg pain. Bonding with baby. Resting well. Lochia is light. Reports pain/cramping  that is controlled with oral meds. She denies headache, vision changes, RUQ pain, epigastric pain, swelling.    OBJECTIVE:     Vitals:  /84   Pulse 87   Temp 97.6 °F (36.4 °C) (Oral)   Resp 18   LMP 2024   SpO2 100%   Breastfeeding Unknown     Constitutional:  Awake, alert, cooperative, no apparent distress. Appears to be bonding well with baby, does not appear overly stressed with infant handling.    Pain: intermittent abdominal cramping, improves with oral meds.     Breasts:  Soft without tenderness, nipples are intact.    Abdominal Exam: Soft, non-tender, lax muscle tone. Fundus is mid-line, firm.  Non-tender with palpation. Bladder non-distended.    Perineum: Intact and healing.                       Lochia: Small and Rubra    Extremities: Negative Philip sign. Minimal edema.    Voiding QS, no BM yet, passing flatus    Labs:   Lab Results   Component Value Date/Time    HGB 12.5 2025 01:49 PM    HCT 35.7 2025 01:49 PM       ASSESSMENT/PLAN:     Malaika Ca is a  post partum vaginal birth Day 1  Continue PP care. Encourage rest, hydration, and ambulation as tolerated.  VSS  Hemoglobin/hematocrit if symptomatic  Reviewed birth experience and she is very happy  Discharge instructions were reviewed regarding perineal care, breast care, activity, rest, diet, secondhand smoke and increased SIDS risk, hygiene and PP depression. Questions were answered.  Discharge planned for today  RTO in 2 weeks    Breastfeeding without difficulty  Breast care reviewed  Denies s/s mastitis    Rh pos/Rubella immune    ROLY Oglesby  Midwife  2025  10:20 AM

## 2025-01-21 NOTE — CARE COORDINATION
Discharge Report    [facility]  Clinical Case Management Department  Written by: SEBASTIÁN FRIAS RN    Patient Name: Malaika Ca  Attending Provider: Landon Perez DO  Admit Date: 2025 12:03 PM  MRN: [id number]  Account: [id number]                     : 2000  Discharge Date: 25      Disposition: home    SEBASTIÁN FRIAS RN

## 2025-01-23 ENCOUNTER — CARE COORDINATION (OUTPATIENT)
Dept: OTHER | Facility: CLINIC | Age: 25
End: 2025-01-23

## 2025-02-03 NOTE — PROGRESS NOTES
Pt here for 2 week post partum visit         2/4/2025     3:44 PM 1/8/2025     1:45 PM 1/2/2025    11:01 AM 9/20/2023     1:54 PM 8/24/2023    12:44 PM 5/15/2023     8:58 AM   Postpartum Depression Scale   I have been able to laugh and see the funny side of things As much as I always could As much as I always could As much as I always could As much as I always could As much as I always could As much as I always could   I have looked forward with enjoyment to things As much as I ever did As much as I ever did As much as I ever did As much as I ever did As much as I ever did As much as I ever did   I have blamed myself unnecessarily when things went wrong No, never No, never Not very often No, never Not very often No, never   I have been anxious or worried for no good reason No, not at all No, not at all No, not at all No, not at all No, not at all No, not at all   I have felt scared or panicky for no good reason No, not at all No, not at all No, not at all No, not at all No, not at all No, not at all   I haven't been able to cope lately No, I have been coping as well as ever No, I have been coping as well as ever No, most of the time I have coped quite well No, I have been coping as well as ever No, I have been coping as well as ever No, I have been coping as well as ever   I have been so unhappy that I have had difficulty sleeping Not at all Not at all Not at all Not at all Not at all Not at all   I have felt sad or miserable No, not at all No, not at all No, not at all No, not at all No, not at all No, not at all   I have been so unhappy that I have been crying No, never No, never No, never No, never No, never No, never   The thought of harming myself has occurred to me Never Never Never Never Never Never   Total 0 0 2 0 1 0          2/4/2025     3:00 PM 1/8/2025     1:00 PM 7/3/2024     2:00 PM   URSULA-7 SCREENING   Feeling nervous, anxious, or on edge Not at all Not at all Not at all   Not being able to stop or

## 2025-02-04 ENCOUNTER — POSTPARTUM VISIT (OUTPATIENT)
Dept: OBGYN CLINIC | Age: 25
End: 2025-02-04

## 2025-02-04 VITALS
SYSTOLIC BLOOD PRESSURE: 100 MMHG | HEIGHT: 63 IN | WEIGHT: 130 LBS | BODY MASS INDEX: 23.04 KG/M2 | DIASTOLIC BLOOD PRESSURE: 58 MMHG

## 2025-02-04 PROBLEM — O44.40 LOW-LYING PLACENTA: Status: RESOLVED | Noted: 2024-09-10 | Resolved: 2025-02-04

## 2025-02-04 PROBLEM — Z34.90 ENCOUNTER FOR INDUCTION OF LABOR: Status: RESOLVED | Noted: 2025-01-19 | Resolved: 2025-02-04

## 2025-02-04 PROBLEM — O09.93 HIGH-RISK PREGNANCY IN THIRD TRIMESTER: Status: RESOLVED | Noted: 2024-06-17 | Resolved: 2025-02-04

## 2025-02-04 PROBLEM — O99.810 ABNORMAL GLUCOSE TOLERANCE TEST (GTT) DURING PREGNANCY, ANTEPARTUM: Status: RESOLVED | Noted: 2024-10-29 | Resolved: 2025-02-04

## 2025-02-04 PROBLEM — Z23 NEED FOR DIPHTHERIA-TETANUS-PERTUSSIS (TDAP) VACCINE: Status: RESOLVED | Noted: 2024-11-25 | Resolved: 2025-02-04

## 2025-02-04 PROBLEM — R79.89 LOW VITAMIN D LEVEL: Status: RESOLVED | Noted: 2024-07-05 | Resolved: 2025-02-04

## 2025-02-04 PROBLEM — O35.07X0 MICROCEPHALY OF FETUS: Status: RESOLVED | Noted: 2024-12-04 | Resolved: 2025-02-04

## 2025-02-04 PROBLEM — B95.1 POSITIVE GBS TEST: Status: RESOLVED | Noted: 2024-12-29 | Resolved: 2025-02-04

## 2025-02-04 PROCEDURE — 99024 POSTOP FOLLOW-UP VISIT: CPT

## 2025-02-04 ASSESSMENT — ANXIETY QUESTIONNAIRES
3. WORRYING TOO MUCH ABOUT DIFFERENT THINGS: NOT AT ALL
7. FEELING AFRAID AS IF SOMETHING AWFUL MIGHT HAPPEN: NOT AT ALL
GAD7 TOTAL SCORE: 0
6. BECOMING EASILY ANNOYED OR IRRITABLE: NOT AT ALL
2. NOT BEING ABLE TO STOP OR CONTROL WORRYING: NOT AT ALL
4. TROUBLE RELAXING: NOT AT ALL
5. BEING SO RESTLESS THAT IT IS HARD TO SIT STILL: NOT AT ALL
IF YOU CHECKED OFF ANY PROBLEMS ON THIS QUESTIONNAIRE, HOW DIFFICULT HAVE THESE PROBLEMS MADE IT FOR YOU TO DO YOUR WORK, TAKE CARE OF THINGS AT HOME, OR GET ALONG WITH OTHER PEOPLE: NOT DIFFICULT AT ALL
1. FEELING NERVOUS, ANXIOUS, OR ON EDGE: NOT AT ALL

## 2025-02-04 NOTE — PROGRESS NOTES
HPI:  DELIVERY DATE: 1/19/25  TYPE OF DELIVERY: normal spontaneous vaginal delivery  PROVIDER: ROLY Costa  PERINEUM: see delivery note    Malaika was seen for her 2 week visit.  Her Male infant is healthy.  She is breast and bottle feeding.    She is breastfeeding without difficulty.  She is not experiencing pain.    She reports her lochia is thin lochia  She reports no perineal discomfort.  She denies urinary incontinence.  Her bowels have returned to her normal pattern.  She is back to her normal activity pattern.  Malaika has not engaged in intercourse.   She does not report a mood disorder.    She feels she is not having difficulty coping.  Malaika feels her family adjustment is effective.  She reports an overall positive birth experience.      OBJECTIVE:   Wt Readings from Last 3 Encounters:   02/04/25 59 kg (130 lb)   01/15/25 64.1 kg (141 lb 6.4 oz)   01/08/25 64 kg (141 lb 3.2 oz)       Blood pressure (!) 100/58, height 1.6 m (5' 3\"), weight 59 kg (130 lb), last menstrual period 04/20/2024, currently breastfeeding.    EPDS:   In the past 7 days:  I have been able to laugh and see the funny side of things: As much as I always could  I have looked forward with enjoyment to things: As much as I ever did  I have blamed myself unnecessarily when things went wrong: No, never  I have been anxious or worried for no good reason: No, not at all  I have felt scared or panicky for no good reason: No, not at all  I haven't been able to cope lately: No, I have been coping as well as ever  I have been so unhappy that I have had difficulty sleeping: Not at all  I have felt sad or miserable: No, not at all  I have been so unhappy that I have been crying: No, never  The thought of harming myself has occurred to me: Never  Total: 0    Social Determinants of Health:   Financial Resource Strain: Low Risk  (7/3/2024)    Overall Financial Resource Strain (CARDIA)     Difficulty of Paying Living Expenses: Not hard at all

## 2025-02-25 NOTE — PROGRESS NOTES
Pt here for 6 week post partum visit         2/27/2025     3:57 PM 2/4/2025     3:44 PM 1/8/2025     1:45 PM 1/2/2025    11:01 AM 9/20/2023     1:54 PM 8/24/2023    12:44 PM 5/15/2023     8:58 AM   Postpartum Depression Scale   I have been able to laugh and see the funny side of things As much as I always could As much as I always could As much as I always could As much as I always could As much as I always could As much as I always could As much as I always could   I have looked forward with enjoyment to things As much as I ever did As much as I ever did As much as I ever did As much as I ever did As much as I ever did As much as I ever did As much as I ever did   I have blamed myself unnecessarily when things went wrong No, never No, never No, never Not very often No, never Not very often No, never   I have been anxious or worried for no good reason Yes, sometimes No, not at all No, not at all No, not at all No, not at all No, not at all No, not at all   I have felt scared or panicky for no good reason No, not at all No, not at all No, not at all No, not at all No, not at all No, not at all No, not at all   I haven't been able to cope lately No, most of the time I have coped quite well No, I have been coping as well as ever No, I have been coping as well as ever No, most of the time I have coped quite well No, I have been coping as well as ever No, I have been coping as well as ever No, I have been coping as well as ever   I have been so unhappy that I have had difficulty sleeping Not at all Not at all Not at all Not at all Not at all Not at all Not at all   I have felt sad or miserable Not very often No, not at all No, not at all No, not at all No, not at all No, not at all No, not at all   I have been so unhappy that I have been crying No, never No, never No, never No, never No, never No, never No, never   The thought of harming myself has occurred to me Never Never Never Never Never Never Never   Total 4 0 0 2

## 2025-02-27 ENCOUNTER — POSTPARTUM VISIT (OUTPATIENT)
Dept: OBGYN CLINIC | Age: 25
End: 2025-02-27

## 2025-02-27 VITALS
WEIGHT: 127.7 LBS | SYSTOLIC BLOOD PRESSURE: 100 MMHG | HEIGHT: 64 IN | BODY MASS INDEX: 21.8 KG/M2 | DIASTOLIC BLOOD PRESSURE: 62 MMHG

## 2025-02-27 DIAGNOSIS — O24.414 INSULIN CONTROLLED GESTATIONAL DIABETES MELLITUS (GDM) IN THIRD TRIMESTER: ICD-10-CM

## 2025-02-27 PROCEDURE — 0503F POSTPARTUM CARE VISIT: CPT

## 2025-02-27 ASSESSMENT — ANXIETY QUESTIONNAIRES
3. WORRYING TOO MUCH ABOUT DIFFERENT THINGS: NOT AT ALL
6. BECOMING EASILY ANNOYED OR IRRITABLE: NOT AT ALL
1. FEELING NERVOUS, ANXIOUS, OR ON EDGE: NOT AT ALL
4. TROUBLE RELAXING: NOT AT ALL
5. BEING SO RESTLESS THAT IT IS HARD TO SIT STILL: NOT AT ALL
2. NOT BEING ABLE TO STOP OR CONTROL WORRYING: NOT AT ALL
GAD7 TOTAL SCORE: 0
7. FEELING AFRAID AS IF SOMETHING AWFUL MIGHT HAPPEN: NOT AT ALL

## 2025-02-27 NOTE — PROGRESS NOTES
Chief Complaint   Patient presents with    Postpartum Care       SUBJECTIVE:    HPI:  DELIVERY DATE: 1/19/25  TYPE OF DELIVERY: normal spontaneous vaginal delivery  PROVIDER: Joselyn DUNHAM  PERINEUM: see delivery note     Malaika was seen for her 6 week postpartum visit.  Her Male infant is healthy.  She is Breast/chest without difficulty.  She is not experiencing pain.   She reports her lochia is no bleeding  She denies perineal discomfort.  She denies urinary incontinence.  Her bowels have returned to her normal pattern.  She is back to her normal activity pattern.  She has not her first menses.    Malaika has not engaged in intercourse.  She does not report a mood disorder.    She feels she is not having difficulty coping.  Malaika feels her family adjustment is effective. She reports adequate support system.   She reports an overall positive birth experience.  Her [place] Score is 4.  This score does match my assessment.  She denies headache, vision changes, RUQ pain, epigastric pain, and swelling.     OBJECTIVE:   Wt Readings from Last 3 Encounters:   02/27/25 57.9 kg (127 lb 11.2 oz)   02/04/25 59 kg (130 lb)   01/15/25 64.1 kg (141 lb 6.4 oz)       Vitals:    02/27/25 1523   BP: 100/62   Site: Left Upper Arm   Position: Sitting   Weight: 57.9 kg (127 lb 11.2 oz)   Height: 1.626 m (5' 4\")        Breast exam: not inspected     Abdomen: Soft non-tender without guarding.    Perineum: not inspected  Incision: n/a    Extremities: No calf tenderness bilaterally. No edema.    EPDS:   In the past 7 days:  I have been able to laugh and see the funny side of things: As much as I always could  I have looked forward with enjoyment to things: As much as I ever did  I have blamed myself unnecessarily when things went wrong: No, never  I have been anxious or worried for no good reason: Yes, sometimes  I have felt scared or panicky for no good reason: No, not at all  I haven't been able to cope lately: No, most of

## 2025-03-06 ENCOUNTER — HOSPITAL ENCOUNTER (OUTPATIENT)
Age: 25
Discharge: HOME | End: 2025-03-06
Payer: COMMERCIAL

## 2025-03-06 DIAGNOSIS — O24.414 INSULIN CONTROLLED GESTATIONAL DIABETES MELLITUS (GDM) IN THIRD TRIMESTER: ICD-10-CM

## 2025-03-06 LAB
EST. AVERAGE GLUCOSE BLD GHB EST-MCNC: 97 MG/DL
HBA1C MFR BLD: 5 % (ref 4–6)

## 2025-03-06 PROCEDURE — 83036 HEMOGLOBIN GLYCOSYLATED A1C: CPT

## 2025-03-06 PROCEDURE — 36415 COLL VENOUS BLD VENIPUNCTURE: CPT

## 2025-03-09 NOTE — LACTATION NOTE
Assisted with getting baby to latch on left side in cross cradle. Education provided on positioning and how to achieve deep latch. Baby latches well, flanged lips, swallows present. Patient states latch feels better.    fever

## 2025-04-29 ENCOUNTER — OFFICE VISIT (OUTPATIENT)
Dept: OBGYN CLINIC | Age: 25
End: 2025-04-29
Payer: COMMERCIAL

## 2025-04-29 ENCOUNTER — HOSPITAL ENCOUNTER (OUTPATIENT)
Age: 25
Setting detail: SPECIMEN
Discharge: HOME | End: 2025-04-29

## 2025-04-29 VITALS
HEART RATE: 79 BPM | SYSTOLIC BLOOD PRESSURE: 100 MMHG | WEIGHT: 118.1 LBS | OXYGEN SATURATION: 99 % | DIASTOLIC BLOOD PRESSURE: 60 MMHG | BODY MASS INDEX: 20.16 KG/M2 | HEIGHT: 64 IN

## 2025-04-29 DIAGNOSIS — N39.490 OVERFLOW INCONTINENCE: ICD-10-CM

## 2025-04-29 DIAGNOSIS — N39.490 OVERFLOW INCONTINENCE: Primary | ICD-10-CM

## 2025-04-29 PROCEDURE — 99214 OFFICE O/P EST MOD 30 MIN: CPT | Performed by: ADVANCED PRACTICE MIDWIFE

## 2025-04-29 NOTE — PROGRESS NOTES
[facility]  [address]   [address]  [facility] [address]  Dept: [phone]    Patient Name: Malaika Ca  Patient : 2000  MRN #: [id number]  [facility] #: [id number]    Date: 2025  Time: 12:44 PM    S:    HPI: Malaika Ca  is a 25 year old , No LMP recorded.  She is 15 weeks postpartum and is still breastfeeding.   She is here today complaining of urine leakage. She reports it will come randomly, she does have to wear protections. Denies leaking w/ laughing/ coughing/ sneezing. She has tried to toilet every 2 hrs without relief. She reports it has been this way since delivery of her last son. It is not getting any better.    She denies dysuria, hematuria, frequency, urgency. She denies fever, bodyaches pelvic or abdominal pain.    Past Medical History:   Diagnosis Date    Diet controlled gestational diabetes mellitus (GDM) in second trimester 10/30/2024    Hypotension      25 M Apg 89 Wt 6#11 2025      Past Surgical History:   Procedure Laterality Date    FOOT SURGERY Left       Social History     Socioeconomic History    Marital status:      Spouse name: Not on file    Number of children: Not on file    Years of education: Not on file    Highest education level: Not on file   Occupational History    Not on file   Tobacco Use    Smoking status: Never    Smokeless tobacco: Never   Vaping Use    Vaping status: Never Used   Substance and Sexual Activity    Alcohol use: Never    Drug use: Never    Sexual activity: Yes     Partners: Male   Other Topics Concern    Not on file   Social History Narrative    Not on file     Social Drivers of Health     Financial Resource Strain: Low Risk  (7/3/2024)    Overall Financial Resource Strain (CARDIA)     Difficulty of Paying Living Expenses: Not hard at all   Food Insecurity: No Food Insecurity (2025)    Hunger Vital Sign

## 2025-04-30 LAB
MICROORGANISM SPEC CULT: ABNORMAL
SERVICE CMNT-IMP: ABNORMAL
SPECIMEN DESCRIPTION: ABNORMAL

## 2025-04-30 NOTE — Clinical Note
Malaika Rothmancher                                                                [address] [address] [address]         4/30/25     Dear Ms. Ca:  MRN:[id number]    This message is regarding a referral that needs to be scheduled. We were unable to reach you by phone; however, your referral is available for review in [facility] under insurance in the drop down menu. We will be making further attempts to contact you to get this scheduled.       Thank you,  [facility]ine [facility]

## 2025-05-01 ENCOUNTER — RESULTS FOLLOW-UP (OUTPATIENT)
Dept: OBGYN CLINIC | Age: 25
End: 2025-05-01

## 2025-05-01 DIAGNOSIS — N30.00 ACUTE CYSTITIS WITHOUT HEMATURIA: Primary | ICD-10-CM

## 2025-05-01 RX ORDER — AMOXICILLIN 875 MG/1
875 TABLET, COATED ORAL 2 TIMES DAILY
Qty: 20 TABLET | Refills: 0 | Status: CANCELLED | OUTPATIENT
Start: 2025-05-01 | End: 2025-05-11

## 2025-05-01 RX ORDER — AMOXICILLIN 875 MG/1
875 TABLET, COATED ORAL 2 TIMES DAILY
Qty: 20 TABLET | Refills: 0 | Status: SHIPPED | OUTPATIENT
Start: 2025-05-01 | End: 2025-05-01 | Stop reason: SDUPTHER

## 2025-05-01 RX ORDER — AMOXICILLIN 875 MG/1
875 TABLET, COATED ORAL 2 TIMES DAILY
Qty: 20 TABLET | Refills: 0 | Status: SHIPPED | OUTPATIENT
Start: 2025-05-01 | End: 2025-05-11

## 2025-05-01 NOTE — TELEPHONE ENCOUNTER
ROSA CNM Incoming Phone Call from Current Patient    Patient Request: patient left message stating her medication was sent to [facility] , pt is requesting medication to be sent to   [facility].     Writer confirmed pharmacy is correct in patients chart for [facility]       Amoxicillin was pend with the correct pharmacy.  Please advise, thank you.       Patient Symptoms:       Last annual visit:       Last provider visit: 4/29/25         Can this concern wait 24-48hrs to be addressed? No      If it cannot wait 24 hours- please route to on call CNM     If patient states if can wait- please route message to CNM that last dealt with this complaint.      * please check CNM schedule to make sure the provider you are routing to is working today!

## 2025-05-01 NOTE — TELEPHONE ENCOUNTER
Rx sent for UTI.  I would not normally treat this bacteria (outside of pregnancy) but since your having urinary issues, we can treat it and see if it improves your symptoms.

## 2025-05-02 NOTE — ADDENDUM NOTE
Addended by: MONIE ESCALERA on: 5/1/2025 08:10 PM     Modules accepted: Orders     Sae Ulloa(Attending)

## 2025-05-20 LAB
CHOLEST SERPL-MCNC: 139 MG/DL (ref 0–199)
CHOLESTEROL/HDL RATIO: 2
GLUCOSE SERPL-MCNC: 81 MG/DL (ref 74–99)
HDLC SERPL-MCNC: 68 MG/DL
LDLC SERPL CALC-MCNC: 64 MG/DL (ref 0–100)
PATIENT FASTING?: NORMAL
TRIGL SERPL-MCNC: 36 MG/DL
VLDLC SERPL CALC-MCNC: 7 MG/DL (ref 1–30)

## 2025-06-13 ENCOUNTER — TELEPHONE (OUTPATIENT)
Dept: OBGYN CLINIC | Age: 25
End: 2025-06-13

## 2025-06-13 RX ORDER — OXYBUTYNIN CHLORIDE 5 MG/1
5 TABLET, EXTENDED RELEASE ORAL EVERY EVENING
Qty: 30 TABLET | Refills: 3 | Status: SHIPPED | OUTPATIENT
Start: 2025-06-13

## 2025-06-13 NOTE — TELEPHONE ENCOUNTER
ROSA CNM Incoming Phone Call from Current Patient    Patient Request: would like to try the medication that was discussed at 4/29/25 visit for urinary incontinence. Pt is scheduled to be seen by PT but not until end of July. Pt would like to try the medication until then if possible. Please advise       Patient Symptoms: n/a      Last annual visit: no hx of annual exam in chart       Last provider visit: 4/29/25         Can this concern wait 24-48hrs to be addressed? Yes        If it cannot wait 24 hours- please route to on call CNM     If patient states if can wait- please route message to CNM that last dealt with this complaint.      * please check CNM schedule to make sure the provider you are routing to is working today!

## 2025-07-28 ENCOUNTER — HOSPITAL ENCOUNTER (OUTPATIENT)
Age: 25
Setting detail: THERAPIES SERIES
Discharge: HOME | End: 2025-07-28
Payer: COMMERCIAL

## 2025-07-28 PROCEDURE — 97530 THERAPEUTIC ACTIVITIES: CPT | Performed by: PHYSICAL THERAPIST

## 2025-07-28 PROCEDURE — 97161 PT EVAL LOW COMPLEX 20 MIN: CPT | Performed by: PHYSICAL THERAPIST

## 2025-07-28 NOTE — CONSULTS
this note, I have reviewed this plan of care and certify a need for medically necessary rehabilitation services.    ++ PLEASE SIGN ABOVE AND FAX BACK ALL PAGES++

## 2025-08-14 ENCOUNTER — HOSPITAL ENCOUNTER (OUTPATIENT)
Age: 25
Setting detail: THERAPIES SERIES
Discharge: HOME | End: 2025-08-14
Payer: COMMERCIAL

## 2025-08-14 PROCEDURE — 97110 THERAPEUTIC EXERCISES: CPT | Performed by: PHYSICAL THERAPIST

## 2025-08-14 PROCEDURE — 97530 THERAPEUTIC ACTIVITIES: CPT | Performed by: PHYSICAL THERAPIST

## 2025-08-14 PROCEDURE — 97112 NEUROMUSCULAR REEDUCATION: CPT | Performed by: PHYSICAL THERAPIST

## 2025-08-25 ENCOUNTER — HOSPITAL ENCOUNTER (OUTPATIENT)
Age: 25
Setting detail: THERAPIES SERIES
Discharge: HOME | End: 2025-08-25
Payer: COMMERCIAL

## 2025-08-25 PROCEDURE — 97530 THERAPEUTIC ACTIVITIES: CPT

## 2025-08-25 PROCEDURE — 97112 NEUROMUSCULAR REEDUCATION: CPT
